# Patient Record
Sex: FEMALE | Race: WHITE | NOT HISPANIC OR LATINO | Employment: OTHER | URBAN - NONMETROPOLITAN AREA
[De-identification: names, ages, dates, MRNs, and addresses within clinical notes are randomized per-mention and may not be internally consistent; named-entity substitution may affect disease eponyms.]

---

## 2017-01-27 ENCOUNTER — HISTORY (OUTPATIENT)
Dept: FAMILY MEDICINE | Facility: OTHER | Age: 67
End: 2017-01-27

## 2017-01-27 ENCOUNTER — HOSPITAL ENCOUNTER (OUTPATIENT)
Dept: RADIOLOGY | Facility: OTHER | Age: 67
End: 2017-01-27
Attending: FAMILY MEDICINE

## 2017-01-27 ENCOUNTER — OFFICE VISIT - GICH (OUTPATIENT)
Dept: FAMILY MEDICINE | Facility: OTHER | Age: 67
End: 2017-01-27

## 2017-01-27 DIAGNOSIS — Z12.39 ENCOUNTER FOR OTHER SCREENING FOR MALIGNANT NEOPLASM OF BREAST: ICD-10-CM

## 2017-01-27 DIAGNOSIS — Z00.00 ENCOUNTER FOR GENERAL ADULT MEDICAL EXAMINATION WITHOUT ABNORMAL FINDINGS: ICD-10-CM

## 2017-01-27 DIAGNOSIS — M54.2 CERVICALGIA: ICD-10-CM

## 2017-01-27 DIAGNOSIS — E78.49 OTHER HYPERLIPIDEMIA: ICD-10-CM

## 2017-01-27 DIAGNOSIS — Z12.31 ENCOUNTER FOR SCREENING MAMMOGRAM FOR MALIGNANT NEOPLASM OF BREAST: ICD-10-CM

## 2017-01-27 DIAGNOSIS — M85.80 OTHER SPECIFIED DISORDERS OF BONE DENSITY AND STRUCTURE, UNSPECIFIED SITE (CODE): ICD-10-CM

## 2017-01-27 LAB
ANION GAP - HISTORICAL: 10 (ref 5–18)
BUN SERPL-MCNC: 18 MG/DL (ref 7–25)
BUN/CREAT RATIO - HISTORICAL: 27
CALCIUM SERPL-MCNC: 9.6 MG/DL (ref 8.6–10.3)
CHLORIDE SERPLBLD-SCNC: 102 MMOL/L (ref 98–107)
CHOL/HDL RATIO - HISTORICAL: 3.54
CHOLESTEROL TOTAL: 248 MG/DL
CO2 SERPL-SCNC: 27 MMOL/L (ref 21–31)
CREAT SERPL-MCNC: 0.66 MG/DL (ref 0.7–1.3)
GFR IF NOT AFRICAN AMERICAN - HISTORICAL: >60 ML/MIN/1.73M2
GLUCOSE SERPL-MCNC: 103 MG/DL (ref 70–105)
HDLC SERPL-MCNC: 70 MG/DL (ref 23–92)
LDLC SERPL CALC-MCNC: 165 MG/DL
NON-HDL CHOLESTEROL - HISTORICAL: 178 MG/DL
PATIENT STATUS - HISTORICAL: ABNORMAL
POTASSIUM SERPL-SCNC: 4.3 MMOL/L (ref 3.5–5.1)
SODIUM SERPL-SCNC: 139 MMOL/L (ref 133–143)
TRIGL SERPL-MCNC: 64 MG/DL

## 2017-01-31 ENCOUNTER — HISTORY (OUTPATIENT)
Dept: RADIOLOGY | Facility: OTHER | Age: 67
End: 2017-01-31

## 2017-01-31 ENCOUNTER — HOSPITAL ENCOUNTER (OUTPATIENT)
Dept: RADIOLOGY | Facility: OTHER | Age: 67
End: 2017-01-31
Attending: FAMILY MEDICINE

## 2017-01-31 DIAGNOSIS — Z12.31 ENCOUNTER FOR SCREENING MAMMOGRAM FOR MALIGNANT NEOPLASM OF BREAST: ICD-10-CM

## 2017-01-31 DIAGNOSIS — Z12.39 ENCOUNTER FOR OTHER SCREENING FOR MALIGNANT NEOPLASM OF BREAST: ICD-10-CM

## 2017-02-08 ENCOUNTER — HOSPITAL ENCOUNTER (OUTPATIENT)
Dept: PHYSICAL THERAPY | Facility: OTHER | Age: 67
Setting detail: THERAPIES SERIES
End: 2017-02-08
Attending: FAMILY MEDICINE

## 2017-02-08 DIAGNOSIS — M54.2 CERVICALGIA: ICD-10-CM

## 2017-02-15 ENCOUNTER — HOSPITAL ENCOUNTER (OUTPATIENT)
Dept: PHYSICAL THERAPY | Facility: OTHER | Age: 67
Setting detail: THERAPIES SERIES
End: 2017-02-15
Attending: FAMILY MEDICINE

## 2017-03-08 ENCOUNTER — HOSPITAL ENCOUNTER (OUTPATIENT)
Dept: PHYSICAL THERAPY | Facility: OTHER | Age: 67
Setting detail: THERAPIES SERIES
End: 2017-03-08
Attending: FAMILY MEDICINE

## 2017-08-18 ENCOUNTER — AMBULATORY - GICH (OUTPATIENT)
Dept: SURGERY | Facility: OTHER | Age: 67
End: 2017-08-18

## 2017-08-18 ENCOUNTER — HISTORY (OUTPATIENT)
Dept: FAMILY MEDICINE | Facility: OTHER | Age: 67
End: 2017-08-18

## 2017-08-18 ENCOUNTER — HISTORY (OUTPATIENT)
Dept: SURGERY | Facility: OTHER | Age: 67
End: 2017-08-18

## 2017-08-18 ENCOUNTER — OFFICE VISIT - GICH (OUTPATIENT)
Dept: FAMILY MEDICINE | Facility: OTHER | Age: 67
End: 2017-08-18

## 2017-08-18 DIAGNOSIS — L98.9 DISORDER OF SKIN OR SUBCUTANEOUS TISSUE: ICD-10-CM

## 2017-08-25 ENCOUNTER — OFFICE VISIT - GICH (OUTPATIENT)
Dept: SURGERY | Facility: OTHER | Age: 67
End: 2017-08-25

## 2017-08-25 DIAGNOSIS — L82.1 OTHER SEBORRHEIC KERATOSIS: ICD-10-CM

## 2017-10-17 ENCOUNTER — AMBULATORY - GICH (OUTPATIENT)
Dept: FAMILY MEDICINE | Facility: OTHER | Age: 67
End: 2017-10-17

## 2017-10-17 DIAGNOSIS — Z23 ENCOUNTER FOR IMMUNIZATION: ICD-10-CM

## 2017-12-27 NOTE — PROGRESS NOTES
Patient Information     Patient Name MRN Sex Edith Alford [id number] Female 1950      Progress Notes by Allyssa Zamudio DO at 2017 12:20 PM     Author:  Allyssa Zamudio DO Service:  (none) Author Type:  PHYS- Osteopathic     Filed:  2017  3:15 PM Encounter Date:  2017 Status:  Signed     :  Allyssa Zamudio DO (PHYS- Osteopathic)            Patient is doing well post-op from there recent skin lesion.   She has been having no nausea or vomiting; no chest pain, shortness of breath or coughing up anything. Patient has been urinating without any difficulties and moving bowel w/ no straining or bleeding. Patient has no calf pain swelling, tenderness, or redness. Patient has been sleeping at night with no problems. Patient has been ambulating without difficulty. Patient has been able to tolerate a regular diet. Patient has had good relief with previously prescribed pain meds.      /80  Temp 98.3  F (36.8  C) (Tympanic)  Wt 61.2 kg (135 lb)  BMI 22.99 kg/m2    HEENT: all negative  No jaundice.  No eye redness or pain.  No throat pain.  L: CTA b/l  Abdom: + BS. no distensions.  LOWER EXTREMITY: no swelling or calf pain  The incision(s) is clean dry and intact without redness, drainage or swelling or bleeding.   ?  Pathology: see Epic  ?  Pt doing well; with no complaints. Reviewed path report. Incision(s): Clean/dry/intact and healing nicely.     Pt should keep the area clean and dry: wash with plain soap and water. Keep covered. Does not need to put anything on the lesions. May use abx ointment if desires.  No strenuous activity or hot tubs/sauna's/pool/lake x1 week. Ice and NSAID's for pain. Monitor for redness/drainage/swelling/increase in pain/temp > 101. May have serous drainage/should not be purulent. Call Clinic if these occur.  As far as heeling: may be red and firm for about 1-3 weeks. This is the normal heeling process and will smooth out to a silvery/white line.  Avoid sun exposure X1 year. May take months for redness to dissipate. If is sun burnt, will stay red. Can use vit E oil.  Today we discussed wound care, activities, return to work, warnings signs. Pt is doing well.    The patient and I reviewed safe sun practices today: the importance of staying out of the sun; especially between 10-PM, wearing sun screen SPF > 50, and protective clothing. We also discussed the ABC's of skin cancers including: changes in size, uniformity, borders, coloration, bleeding, or any irregularity or changes. If these occur, seek medical attention. The patient should have a complete skin exam by a medical professional every 6 months, and any questionable/suspicious, or changing lesions should be removed.         Patient Active Problem List     Diagnosis  Code     Carpal tunnel syndrome G56.00     ACP (advance care planning) Z71.89     Osteopenia M85.80     Hyperlipidemia E78.5         Call the office if have any questions or concern's.  F/u with PCP for routine medical care.  Cuellar not require further pain med's.  Will F/U :finn Zamudio, DO

## 2017-12-27 NOTE — PROGRESS NOTES
Patient Information     Patient Name MRN Sex Edith Alford [id number] Female 1950      Progress Notes by Allyssa Zamudio DO at 2017  3:00 PM     Author:  Allyssa Zamudio DO Service:  (none) Author Type:  PHYS- Osteopathic     Filed:  2017  1:10 PM Encounter Date:  2017 Status:  Signed     :  Allyssa Zamudio DO (PHYS- Osteopathic)            Clinic Procedure Note      PMx, PSx, and social Hx are reviewed and updated in [facility]    Current Outpatient Prescriptions on File Prior to Visit       Medication  Sig Dispense Refill     CALCIUM CARBONATE/VITAMIN D2 (CALCIUM 600 + D ORAL) Take  by mouth. Take daily as directed on bottle       multivitamins-minerals-lutein (CENTRUM SILVER) Tab tablet Take 1 tablet by mouth once daily.       PYRIDOXINE HCL (VITAMIN B-6 ORAL) Take 1 Tab by mouth once daily.       No current facility-administered medications on file prior to visit.        No Known Allergies      No visits with results within 90 Day(s) from this visit.  Latest known visit with results is:    Office Visit on 2017      Component  Date Value     CHOLESTEROL,TOTAL 2017 248*     TRIGLYCERIDES 2017 64      HDL CHOLESTEROL 2017 70      NON-HDL CHOLESTEROL 2017 178*     CHOL/HDL RATIO            2017 3.54      LDL CHOLESTEROL 2017 165*     PATIENT STATUS            2017 NON-FASTING      SODIUM 2017 139      POTASSIUM 2017 4.3      CHLORIDE 2017 102      CO2,TOTAL 2017 27      ANION GAP 2017 10      GLUCOSE 2017 103      CALCIUM 2017 9.6      BUN 2017 18      CREATININE 2017 0.66*     BUN/CREAT RATIO           2017 27      GFR if  2017 >60      GFR if not  [employer]meri* 2017 >60          Any imagining associated with this vist was reviewed.      Indication  Edith Smith is seen at the request of the Cyndie Andre MD.  Edith Smith  presents for  lesion removal. We have discussed this procedure, including option  of not performing surgery, technique of surgery and potential for  bleeding/infection/scarring/need for removal of more tissue and post-procedural care.  Patient is able to do post procedural dressing changes and understands what is  Expected.  + sun exposure  No history of skin cancer.  No blood thinners.     OBJECTIVE:    Edith Smith appears well. Vitals are normal. The patient has no allergies to lidocaine or  suture material. The patient not on any blood thinners.  Informed consent was obtained prior to beginning the procedure. The area was marked  and doubled checked/ID ed with the pt. PAUSE for the CAUSE completed. The risks of  lesion removal include but are not limited to: bleeding, infection, scarring, reoccurrence,  and the need for removal of more tissue, and complications of anesthesia.    Location  ASSESSMENT: The lesion is o.5Cm in size. Color:flesh   Borders: reg  Differential diagnosis includes:new lesion     Location: below LEFT orbit    Procedural Sedation  1% lidocaine w/ Epinephrine  1cc    Technique  Patient appears well. Vitals are normal. The patient has no allergies to lidocaine or  suture material. The patient not on any blood thinners.  Edith Smith has no history of keloids or problems with healing in the past.    Skin: see HPI    Informed consent was obtained prior to beginning the procedure. The area was marked  and doubled checked/ID ed with the pt. PAUSE for the CAUSE completed. The risks of  lesion removal include but are not limited to: bleeding, infection, scarring, reoccurrence,  and the need for removal of more tissue, and complications of anesthesia.  After informed consent was obtained, using Chloroprep for cleansing and 1%   Lidocaine w/ epi for anesthetic; using sterile technique, PROCEDURE:excisioin  was performed.    The lesion is 0.25cm in size. The incision was 0.3Cm long.  Closure:cauterized w/  silver nittrate.     An Antibiotic salve and steriledressing is applied, and wound care instructions provided. The procedure was well tolerated without complications.    Plan:  The patient will follow up in 7- review of pathology. If thereis any bleeding/redness/drainage/swelling/pain or tenderness- call the clinic. Patient  was given instructions in wound care, acivity, warning signs, appointment for follow up.  If the patient has any questions or concerns, should call our clinic or go to ER/urgent  care after hours. Patient expressed understanding in directions for care, and was given a  written copy of instructions.    Rx=see EPIC    Pt tolerated the procedure well without complications  Patient was given instruction in activity restrictions, wound care and dressing changes. Medication usage, diet, warning signs to look for (and what to do if they occur), and an appointment for follow-up.      Caring for Your Incision      You ll need to help care for your incision after surgery and certain medical procedures. To close an incision, your healthcare provider used stitches (sutures), special strips of surgical tape called Steri-Strips, surgical staples, or surgical skin glue. Follow the tips on this sheet to help stop bleeding, speed healing, and prevent infection of your incision.      Types of incision closures        Home care  Always wash your hands before touching your incision.  Keep the incision clean, dry, and out of water, keep the incision out of water.  Do not to pick at the scabs. Scabs help protect the wound.  You can take a shower in 24 hours and wash the incision with soap and water. Pat dry/don t scrub. It s OK to wash around the incision. But don t spray water directly on it.  Pat stitches dry if they get wet. Don't rub.  Check the incision site daily for pain, redness, drainage, swelling, or separation of the incision edges.  If there is a bandage (dressing) over the incision, leave the dressing in  place until you are told to remove it or change it. Using clean hands change the dressing as directed by your healthcare provider. Always wash your hands before changing your dressing.  Make sure any clothing that touches the incision is loose-fitting. This will prevent rubbing.   Try to avoid from rough play, contact sports, or physical activities. This can put you at risk of opening the incision.  Make sure you avoid doing things that could cause dirt or sweat to get in or on the incision.  As your incision heals, the skin may appear pink or red. It may also feel slightly bumpy or raised. This is called a healing ridge. Over time, the color should fade and the raised skin will become less noticeable.    Care for specific closures  Follow these guidelines unless your healthcare provider tells you otherwise:  Sutures or staples. Once you no longer need to keep these dry, clean the incision or wound daily. First remove the bandage using clean hands. Then wash the area gently with soap and warm water. Use a wet cotton swab to loosen and remove any blood or crust that forms. After cleaning, put a thin layer of antibiotic ointment on. Then put on a new bandage.      Pain Control    Use ice!  Ice keeps the swelling down and swelling is what causes pain.  Never apply ice directly to the skin.  Wrap it in a towel or cloth.  Apply ice 20 minutes on and 20 minutes off for pain control.  Use as needed.    Take tylenol 325 mg by mouth with food every 4 hours as needed for pain. Or ibuprofen 400 mg by mouth with food every 4 hours as needed for pain.  Do not take tylenol if you have a history of heavy drinking , hepatits C or liver problems.  Do not take ibuprofen if you have a history of stomach ulcers/problems, bleeding problem or kidney issues.           Follow-up care      Follow up with your healthcare provider to ask how long sutures or staples should be left in place. Be sure to return for suture or staple removal as  directed. If dissolving stitches were used in your mouth, these will not need to be removed. They should fall out or dissolve on their own.  If tape closures were used, remove them yourself when your healthcare provider tells you to if they have not fallen off on their own. If skin glue was used to close your incision, the glue will wear off by itself.    When to seek medical care  Call your healthcare provider right away if you has any of these:  More pain, redness, swelling, bleeding, or foul-smelling discharge around the incision area  Fever of 101 F (38.3 C) or higher, or as directed by your  healthcare provider  Shaking chills  Vomiting or nausea that doesn t go away  Numbness, coldness, or tingling around the incision area, or changes in skin color  Opening of the sutures or wound  Stitches or staples come apart or fall out or surgical tape falls off before 7 days, or as directed by your healthcare provider

## 2017-12-28 NOTE — PATIENT INSTRUCTIONS
Patient Information     Patient Name MRN Sex Edith Alford [id number] Female 1950      Patient Instructions by Allyssa Zamudio DO at 2017  3:00 PM     Author:  Allyssa Zamudio DO  Service:  (none) Author Type:  PHYS- Osteopathic     Filed:  2017  1:10 PM  Encounter Date:  2017 Status:  Addendum     :  Allyssa Zamudio DO (PHYS- Osteopathic)        Related Notes: Original Note by Allyssa Zamudio DO (PHYS- Osteopathic) filed at 2017  1:09 PM            Caring for Your Incision      You ll need to help care for your incision after surgery and certain medical procedures. To close an incision, your healthcare provider used stitches (sutures), special strips of surgical tape called Steri-Strips, surgical staples, or surgical skin glue. Follow the tips on this sheet to help stop bleeding, speed healing, and prevent infection of your incision.      Pain Control    Use ice!  Ice keeps the swelling down and swelling is what causes pain.  Never apply ice directly to the skin.  Wrap it in a towel or cloth.  Apply ice 20 minutes on and 20 minutes off for pain control.  Use as needed.    Take tylenol 325 mg by mouth with food every 4 hours as needed for pain. Or ibuprofen 400 mg by mouth with food every 4 hours as needed for pain.  Do not take tylenol if you have a history of heavy drinking , hepatits C or liver problems.  Do not take ibuprofen if you have a history of stomach ulcers/problems, bleeding problem or kidney issues.     Types of incision closures    Surgical stitches (sutures) are placed by sewing the edges of an incision together with surgical thread. Sutures are either absorbable or non-absorbable. Absorbable sutures break down in the body over time. Non-absorbable sutures need to be removed.    Home care  Always wash your hands before touching your incision.  Keep the incision clean, dry, and out of water, keep the incision out of water.  Do not to pick at the scabs.  Scabs help protect the wound.  You can take a shower in 24 hours and wash the incision with soap and water. Pat dry/don t scrub. It s OK to wash around the incision. But don t spray water directly on it.  Pat stitches dry if they get wet. Don't rub.  Check the incision site daily for pain, redness, drainage, swelling, or separation of the incision edges.  If there is a bandage (dressing) over the incision, change this every 24 hours as instructed by your provider. Using clean hands change the dressing as directed by your healthcare provider. Always wash your hands before changing your dressing.  Make sure any clothing that touches the incision is loose-fitting. This will prevent rubbing. If the incision is on the head, keep your child from wearing caps or other head coverings. These may rub against the incision.  Try to avoid from rough play, contact sports, or physical activities for two weeks. This can put you at risk of opening the incision.  Make sure you avoid doing things that could cause dirt or sweat to get in or on the incision.  As your incision heals, the skin may appear pink or red. It may also feel slightly bumpy or raised. This is called a healing ridge. Over time, the color should fade and the raised skin will become less noticeable.    Care for specific closures  :  Sutures or staples. Once you no longer need to keep these dry, clean the incision or wound daily, generally after the first 24 hours.  First remove the bandage using clean hands. Then wash the area gently with soap and warm water. Use a wet cotton swab to loosen and remove any blood or crust that forms. After cleaning, put a thin layer of antibiotic ointment on. Then put on a new bandage.      Follow-up care  Staples or sutures generally need to be removed in 7days.    Be sure to return for suture or staple removal as directed. If dissolving stitches were used in your mouth, these will not need to be removed. They should fall out or  dissolve on their own.  If tape closures were used, remove them yourself when your healthcare provider tells you to if they have not fallen off on their own.     When to seek medical care  Call your healthcare provider right away if you have any of these:  More pain, redness, swelling, bleeding, or foul-smelling discharge around the incision area  Fever of 101 F (38.3 C) or higher, or as directed by your child's healthcare provider  Shaking chills  Vomiting or nausea that doesn t go away  Numbness, coldness, or tingling around the incision area, or changes in skin color  Opening of the sutures or wound  Stitches or staples come apart or fall out or surgical tape falls off before 7 days, or as directed by your healthcare provider

## 2017-12-28 NOTE — PATIENT INSTRUCTIONS
Patient Information     Patient Name MRN Sex Edith Alford [id number] Female 1950      Patient Instructions by Allyssa Zamudio DO at 2017 12:20 PM     Author:  Allyssa Zamudio DO Service:  (none) Author Type:  PHYS- Osteopathic     Filed:  2017  3:14 PM Encounter Date:  2017 Status:  Signed     :  Allyssa Zamudio DO (PHYS- Osteopathic)            Pt should keep the area clean and dry: wash with plain soap and water. Keep covered. Does not need to put anything on the lesions. May use abx ointment if desires. Avoid lifting Over 5 #'s x1 week. No strenuous activity or hot tubs/sauna's/pool/lake x1 week. Ice and NSAID's for pain. Monitor for redness/drainage/swelling/increase in pain/temp > 101. May have serous drainage/should not be purulent. Call Clinic if these occur.  As far as heeling: may be red and firm for about 1-3 weeks. This is the normal heeling process and will smooth out to a silvery/white line. Avoid sun exposure X1 year. May take months for redness to dissipate. If is sun burnt, will stay red. Can use vit E oil.  Today we discussed wound care, activities, return to work, warnings signs. Pt is doing well.

## 2017-12-30 NOTE — NURSING NOTE
Patient Information     Patient Name MRN Sex Edith Alford [id number] Female 1950      Nursing Note by Edith Pleitez at 2017 12:20 PM     Author:  Edith Pleitez Service:  (none) Author Type:  (none)     Filed:  2017 12:09 PM Encounter Date:  2017 Status:  Signed     :  Edith Pleitez            Patient comes in for follow up after removing skin lesion by left eye.  Edith Pleitez LPN ....................  2017   12:09 PM

## 2017-12-30 NOTE — NURSING NOTE
Patient Information     Patient Name MRN Edith Reeves [id number] Female 1950      Nursing Note by Lalitha Moore at 2017 11:30 AM     Author:  Lalitha Moore Service:  (none) Author Type:  (none)     Filed:  2017 11:45 AM Encounter Date:  2017 Status:  Signed     :  Lalitha Moore            Patient here to have a growth checked under her left eye that has slight discoloration.   Lalitha Moore LPN ..........2017 11:20 AM

## 2017-12-30 NOTE — NURSING NOTE
Patient Information     Patient Name MRN Sex Edith Alford [id number] Female 1950      Nursing Note by Gisselle Sanchez at 2017  3:00 PM     Author:  Gisselle Sanchez Service:  (none) Author Type:  (none)     Filed:  2017  3:16 PM Encounter Date:  2017 Status:  Signed     :  Gisselle Sanchez            Universal Protocol    A. Pre-procedure verification complete yes  1-relevant information / documentation available, reviewed and properly matched to the patient; 2-consent accurate and complete, 3-equipment and supplies available    B. Site marking complete No  Site marked if not in continuous attendance with patient    C. TIME OUT completed yes  Time Out was conducted just prior to starting procedure to verify the eight required elements: 1-patient identity, 2-consent accurate and complete, 3-position, 4-correct side/site marked (if applicable), 5-procedure, 6-relevant images / results properly labeled and displayed (if applicable), 7-antibiotics / irrigation fluids (if applicable), 8-safety precautions.  Gisselle Sanchez LPN.......................... 2017  3:14 PM

## 2017-12-30 NOTE — NURSING NOTE
Patient Information     Patient Name MRN Edith Reeves [id number] Female 1950      Nursing Note by Katalina Malhotra at 2017  3:00 PM     Author:  Katalina Malhotra Service:  (none) Author Type:  (none)     Filed:  2017  3:22 PM Encounter Date:  2017 Status:  Signed     :  Katalina Malhotra            Patient here for consult of lesion under left eye. Relatively new, not changing  Katalina Malhotra LPN..............................2017  2:58 PM

## 2018-01-03 NOTE — PROGRESS NOTES
Patient Information     Patient Name MRN Sex     Edith Smith [id number] Female 1950      Progress Notes by Delma Matamoros PT at 3/8/2017  8:46 AM     Author:  Delma Matamoros PT Service:  (none) Author Type:  PT- Physical Therapist     Filed:  3/8/2017  1:36 PM Date of Service:  3/8/2017  8:46 AM Status:  Signed     :  Delma Matamoros PT (PT- Physical Therapist)            [facility]  Outpatient PT - Progress Note        Date of Service:  3/8/2017    Number of Visits: 3    Patient Name: Edith Smith   YOB: 1950   Referring MD/Provider: Cyndie Andre MD  Medical and Treatment Diagnosis: Neck pain on left side  PT Treatment Diagnosis: left sided neck pain, degenerative disc disease/OA, limited ROM, postural dysfunction, myofascial restriction, neural restriction, facet, rib and uncovertebral joint restriction  Insurance: Medicare and [facility]  Start of Service: 17  Certification Dates: Start of Service: 17  Medicare/[place] Re-Cert Due: 17            Subjective      Patient returns after being gone on vacation. Notes that her neck is much better. It is not a constant ache and my mobility is better. I have been doing all my stretches.  I do have a very sore spot on my spine, just above the waist.  Notes that she has gone about 5 nights without having to take ibuprofen. Notes that she is using it as needed during the day--only occasionally. Notes that she is just not as tight any more.     Pain Ratin = no pain, comfortable and  1 = Mild Pain, (Bothersome, Annoying, Irritating, Nagging) / Location:  Left upper back and neck.If I move my neck I still get a sharp pain that is 4-5/10.     Subjective rating of current functional limitations:     Patient Specific Functional and Pain Scales (PSFS):   Clinician Instructions: Complete after the history and before the exam.   Initial Assessment: We want to know what 3 activities in your life you are  unable to perform, or are having the most difficulty performing, as a result of your chief problem. Please list and score at least 3 activities that you are unable to perform, or having the most difficulty performing, because of your chief problem.   Patient Specific Activity Scoring Scheme (score one number for each activity):   Activity Initial Score (0-10)  0= Unable to perform activity  10= Able to perform activity at same level as before injury or problem Current Score 3/8/2017    1. After Shoveling snow 8/10 8/10 has not done   2. Working around house 7/10 10/10   3. Looking to the left 4/10 8/10   Totals:  19/30 = 63 % ability which relates to 37% impairment 26/30 = 87% ability which relates to 13% impairment   Patient verbally states that they understand that the information they have provided above is current and complete to the best of their knowledge.   Patient Specific Functional Scale Modifier Scale Conversion: (patient's modifier that correlates with pt's score on PSFS): 7-CI (10% Impaired).              Functional Activity No Difficulty Mild Difficulty Mod. Difficulty Severe Difficulty   Sleeping x         Walking x         Lifting/Carrying x         Bending    x      Sitting/Driving   1 Hour x         Work Activities  x x              Objective  Items left blank indicate that the test was inappropriate or not meaningful at the time of visit and therefore not performed.  Postural loading:  General Listening: left  lower cervical/upper thoracic  Head: Left to left and left to right  Shoulders: decreased bilateral proximal  Pelvis: decreased but symmetrical    Cervical AROM    Flexion WNL    Extension 30      Right Left   Sidebend painful right and left 20 20   Rotation painful left 70 62          Much less myofascial tightness in the cervical region noted today vs initial session.    Positional Thoracic Facet Diagnoses:  FRS right  T12-L1, left T11-12         Noted to have much less tightness in the  "left levator scapula with restriction of the left dorsal scapular nerve. Trigger point at the levator attachment to the scapula. Very tender yet over C56 left with neural restriction noted.            Today's Intervention:    MET seated FRS right T12-L1, left T11-12  Seated thoracolumbar side glides  Neural manipulation right and left dorsal scapular nerve  Gentle manual stretch to right and left levator scapula  Neural manipulation C5, C6 right,  C5 right <> superior brachial plexus  Supine QL stretch    Verbal review of HEP with no concerns noted by pt    Home Exercise Program:  sidelying trunk rotation for thoracic mobilization: both knees flexed, below, at and above hip level X 3 each bilaterally  Seated upper trap/SCM stretch 3 X 10-15 seconds bilaterally  Seated levator scapula stretch 3 X 10-15 seconds bilaterally  Seated scalene stretch 3 X 10-15 seconds bilaterally with care on the right  Supine Luxembourger with LTR X 8-10 reciprocally  Seated anterior cervical fascial self stretch 3 X 15 seconds  Clavipectoral Fascia self stretch in doorframe 3 X 10-15\" bilaterally  Seated chin nods  Supine QL stretch     Increase water intake, heat/ice prn     Assessment     Therapist Assessment / Clinical Impression: Edith Smith has been seen 3 times in PT due to left sided neck pain which has been present for about one year She is currently much improved and pleased with her status. Feels her HEP is very helpful. She has note improvement in turning her head as well as doing housework. Less need for ibuprofen is reported to self manage. Clinically she has made significant improvement in her ROM and flexibility with much less myofascial and neural restriction noted. She continues to present with  left sided neck pain, degenerative disc disease/OA,  Slightly limited ROM, myofascial restriction, neural restriction.  Excellent compliance is noted with HEP and she feels ready to continue independently.    Clinical " Impression:  Cervical pain left  Facet, uncovertebral and rib dysfunction: cervical, thoracic  Postural Dysfunction:  Myofascial:  Limited ROM     Direct PT services are indicated to address the above noted impairments and to promote self management.        G codes and Modifier taken from patient completing the PSFS: 3/8/2017   Current Primary G Code and Modifier:   Per the Patient's intake and/or assessment the Primary G Code is: Handling Objects .  The Patient's Impairment, Limitation or Restriction Modifier would be best described as: CI 1% - 20% Impairment.   Goal Primary G Code and Modifier:   The Patient's G Code Goal would be: Handling Objects   The Patient's Impairment, Limitation or Restriction Modifier goal would be best described as: CI - 1% - 20% Impairment.      Goals:  Patient goal (time reference required): To take care of the pain without having to take medication and to learn more about what is going on in my neck. (4-8 weeks) . 3/8/2017 much improved     Long term goal: Patient is to self-manage symptoms and return to prior/improved function in 4-8 weeks.  3/8/2017 in progress but should continue to improve with HEP.     Functional goals:   Patient is to be independent in their Home Exercise Program in 4 weeks. 3/8/2017 Goal met.  Patient is to have improved cervical spine mobility to 65 degrees rotation left to allow for improved looking over her shoulder in 8 weeks. 3/8/2017 Goal partially met.  Patient is report the ability to perform normal daily activities such as housework with proper pacing and rest breaks with minimal to no neck pain in 8 weeks. 3/8/2017 Goal met.  Patient is to display and maintain normal joint mobility/symmetry in the thoracic spine to allow better support of the neck in 8 weeks 3/8/2017 in progress  Patient is to demonstrate decreased muscular pain and tightness on the left to assist her with self management in 8 weeks.  3/8/2017 in progress         Response to  Intervention:  Patient noting improvement. Much less myofascial tightness and improved ROM noted.    Plan    Treatment Plan / Targeted Outcomes:     Frequency:   8-12 visits     Duration of Treatment: 2 months    Planned Interventions:  May include any of the following:  Home Exercise Program development  Therapeutic Exercise (ROM & Strengthening)  Manual Therapy  Neuromuscular Re-education  Ultrasound  Electrical Stimulation  Manual traction  Therapeutic Activities  Posture and Body Mechanics Education    Plan for next visit:  No further PT scheduled at this time: will hold chart X 4 weeks and pt will continue with HEP. She will contact dept to schedule further PT should she fail to improve on her own.    Student or PTA has been instructed in and demonstrates skills necessary to carry out above stated treatment plan: Yes    Thank you for your referral to [facility].  Please call with any questions, concerns or comments.  [phone]

## 2018-01-03 NOTE — PROGRESS NOTES
Patient Information     Patient Name MRN Sex Edith Alford [id number] Female 1950      Progress Notes by Joaquina Bonds R.T. [facility] at 2017 12:44 PM     Author:  Joaquina Bonds R.T. ([facility]) Service:  (none) Author Type:  (none)     Filed:  2017 12:44 PM Date of Service:  2017 12:44 PM Status:  Signed     :  Joaquina Bonds R.T. (TRT) ([facility] - Registered Radiologic Technologist)            Falls Risk Criteria:    Age 65 and older or under age 4        Sensory deficits    Poor vision    Use of ambulatory aides    Impaired judgment    Unable to walk independently    Meets High Risk criteria for falls:  Yes               1.  Do you have dizziness or vertigo?    no                    2.  Do you need help standing or walking?   no                 3.  Have you fallen within the last 6 months?    no           4.  Has the patient been fasting?      no       If any risks are marked Yes, the following interventions are utilized:    Do not leave patient unattended     Assist patient in the dressing room and bathroom    Have ambulatory aides available throughout procedure    Involve patient s family if available

## 2018-01-03 NOTE — NURSING NOTE
Patient Information     Patient Name MRN Edith Reeves [id number] Female 1950      Nursing Note by Cha Narayan at 2017  8:30 AM     Author:  Cha Narayan Service:  (none) Author Type:  (none)     Filed:  2017  8:45 AM Encounter Date:  2017 Status:  Signed     :  Cha Narayan MARTA Smith is a 67 y.o. female presenting for a physical today.  Cha Narayan LPN 2017 8:34 AM
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
## 2018-01-03 NOTE — PROGRESS NOTES
Patient Information     Patient Name MRN Sex Edith Alford [id number] Female 1950      Progress Notes by Cyndie Andre MD at 2017  8:30 AM     Author:  Cyndie Andre MD Service:  (none) Author Type:  Physician     Filed:  2017  9:48 AM Encounter Date:  2017 Status:  Signed     :  Cyndie Andre MD (Physician)            ANNUAL PHYSICAL - FEMALE    HPI: Edith Smith is a 67 y.o. female who presents for a yearly exam.  Concerns include: intermittent L neck pain for the past year, more constant recently.     No LMP recorded. Patient is postmenopausal.   Contraception: n/a  Risk for STI?: no  Last pap:   Any hx of abnormal paps:  no  FH of early CA?: no  Tobacco?: no  Calcium intake: yes  DEXA:   Last mammo: 2015  Colonoscopy:   Immunizations: up to date     Patient Active Problem List       Diagnosis  Date Noted     Hyperlipidemia  2017     Osteopenia  01/10/2014     ACP (advance care planning)  2014     Carpal tunnel syndrome  2013     History of            Past Medical History      Diagnosis   Date     Lyme disease  2012     Vaginal delivery       x 1        Past Surgical History       Procedure   Laterality Date     Tubal ligation        age 38       Dilation and curettage        age 17       Colonoscopy screening   11     Hyperplastic polyps only.  Next due        Cataract removal        Dr. Beltran. rt       Cataract removal   10/2013     Dr. Walden, left         Social History     Social History        Marital status:       Spouse name: N/A     Number of children:  N/A     Years of education:  N/A     Occupational History      Not on file.     Social History Main Topics          Smoking status:   Former Smoker      Types:  Cigarettes      Quit date:  2000      Smokeless tobacco:   Never Used      Alcohol use   3.5 oz/week     7 Standard drinks or equivalent per week        Comment: slightly       Drug use:   No       "Sexual activity:   Not on file      Other Topics  Concern     Not on file      Social History Narrative     . 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    Enjoys gardening, regular exercise, outdoor activity.     1 grown son, no regular contact.     [place]                                    Family History       Problem   Relation Age of Onset     Cancer  Mother      lung CA,  age 65, borderline DM.       Other  Father       age 94, related to CVA, HTN       Other  Sister 71     osteopenia, uterine CA       Other  Sister      fibromyalgia,  age 77, multiple chronic health issues, unknown cause of death       Cancer-breast  No Family History        Current Outpatient Prescriptions       Medication  Sig Dispense Refill     CALCIUM CARBONATE/VITAMIN D2 (CALCIUM 600 + D ORAL) Take  by mouth. Take daily as directed on bottle       multivitamins-minerals-lutein (CENTRUM SILVER) Tab tablet Take 1 tablet by mouth once daily.       PYRIDOXINE HCL (VITAMIN B-6 ORAL) Take 1 Tab by mouth once daily.       No current facility-administered medications for this visit.      Medications have been reviewed by me and are current to the best of my knowledge and ability.       REVIEW OF SYSTEMS:  15 system ROS completed and negative other than: See HPI.    PHYSICAL EXAM:  Visit Vitals       /74     Pulse 76     Ht 1.638 m (5' 4.5\")     Wt 58.6 kg (129 lb 3.2 oz)     Breastfeeding No     BMI 21.83 kg/m2     CONSTITUTIONAL:  Alert, cooperative, NAD.  EYES: No scleral icterus.  PERRLA.  Conjunctiva clear.  ENT/MOUTH: External ears and nose normal.  TMs normal.  Moist mucous membranes. Oropharynx clear.    ENDO: No thyromegaly or thyroid nodules.  LYMPH:  No cervical or supraclavicular LA.    BREASTS: No skin abnormalities, no erythema.  No discrete masses.  No nipple discharge, no axillary, supra- or infraclavicular " LA.   CARDIOVASCULAR: Regular, S1, S2.  No S3 or S4.  No murmur/gallop/rub.  No peripheral edema.  RESPIRATORY: CTA bilaterally, no wheezes, rhonchi or rales.  GI: Bowel sounds wnl.  Soft, nontender, nondistended.  No masses or HSM.  No rebound or guarding.  : Vulva: normal, no lesions or discharge  Urethral meatus: normal size and location, no lesions or discharge  Urethra: no tenderness or masses  Bladder: no fullness or tenderness  Vagina: normal appearance, no abnormal discharge, no lesions.  No evidence of cystocele or rectocele.  Cervix: no cervical motion tenderness  Uterus: normal size and position, mobile, non-tender  Pap smear obtained: no  Adnexa: no palpable masses bilaterally  MSKEL: Grossly normal ROM.  No clubbing.  INTEGUMENTARY:  Warm, dry.  No rash noted on exposed skin.  NEUROLOGIC: Facies symmetric.  Grossly normal movement and tone.  No tremor.  PSYCHIATRIC: Affect normal.  Speech fluent.  Though content linear.     ASSESSMENT/PLAN:    ICD-10-CM    1. Other hyperlipidemia E78.4 LIPID PANEL      BASIC METABOLIC PANEL      LIPID PANEL      BASIC METABOLIC PANEL   2. Osteopenia M85.80    3. Health care maintenance Z00.00    4. Encounter for screening breast examination Z12.39 XR MAMMO BILAT SCREENING   5. Encounter for screening mammogram for malignant neoplasm of breast  Z12.31 XR MAMMO BILAT SCREENING   6. Neck pain on left side M54.2 XR SPINE CERVICAL 3 VIEWS      AMB CONSULT TO PHYSICAL THERAPY       Relevant cancer screening discussed.    Counseled on healthy diet, Calcium and vitamin D intake, and exercise.    Cnydie Andre MD

## 2018-01-03 NOTE — PROGRESS NOTES
Patient Information     Patient Name MRN Sex Edith Alford [id number] Female 1950      Progress Notes by Cyndie Andre MD at 2017 12:14 PM     Author:  Cyndie Andre MD Service:  (none) Author Type:  Physician     Filed:  2017 12:14 PM Date of Service:  2017 12:14 PM Status:  Signed     :  Cyndie Andre MD (Physician)            Notified of results via [employer].

## 2018-01-03 NOTE — PROGRESS NOTES
Patient Information     Patient Name MRN Sex Edith Alford [id number] Female 1950      Progress Notes by Cyndie Andre MD at 2017 12:13 PM     Author:  Cyndie Andre MD Service:  (none) Author Type:  Physician     Filed:  2017 12:13 PM Encounter Date:  2017 Status:  Signed     :  Cyndie Andre MD (Physician)            Notified of results via [employer].

## 2018-01-03 NOTE — PROGRESS NOTES
"Patient Information     Patient Name MRN Sex Edith Alford [id number] Female 1950      Progress Notes by Delma Matamoros, PT at 2/15/2017  8:46 AM     Author:  Delma Matamoros PT Service:  (none) Author Type:  PT- Physical Therapist     Filed:  2/15/2017 10:45 AM Date of Service:  2/15/2017  8:46 AM Status:  Signed     :  Delma Matamoros PT (PT- Physical Therapist)            [facility]  Outpatient PT - Daily Note        Date of Service: 2/15/2017     Number of Visits: 2    Patient Name: Edith Smith   YOB: 1950   Referring MD/Provider: Cyndie Andre MD  Medical and Treatment Diagnosis: Neck pain on left side  PT Treatment Diagnosis: left sided neck pain, degenerative disc disease/OA, limited ROM, postural dysfunction, myofascial restriction, neural restriction, facet, rib and uncovertebral joint restriction  Insurance: Medicare and [facility]  Start of Service: 17  Certification Dates: Start of Service: 17  Medicare/MA Re-Cert Due: 17            Subjective      \"I think my pain is better. I do have my moments but overall it is better. I am moving better. I still have a period of time in the afternoon where my neck is really sore. I usually end up taking something for it. My exercises went well. \"     Pain Ratin = no pain, comfortable and  1 = Mild Pain, (Bothersome, Annoying, Irritating, Nagging) / Location:  Left upper back and neck. I have one spot at the top of the shoulder blade that is very painful.        Objective  Items left blank indicate that the test was inappropriate or not meaningful at the time of visit and therefore not performed.  Postural loading:  General Listening: left  lower cervical/upper thoracic  Head: Left to left and left to right  Shoulders: decreased bilateral proximal  Pelvis: decreased but symmetrical    Rotation AROM: 70 right , 63 left. Extension to 25.    Much less myofascial tightness in the " "cervical region noted today vs initial session.    Positional Thoracic Facet Diagnoses:    ERS Right ERS Left Neutral  SB R/Rot Left Neutral  SB L/Rot Right FRS Right FRS Left   C7-T1         x     T1-2  x        X    T2-3 x       x     T3-4 x       x     T4-5 x            T5-6           x   T6-7         x     T7-8               T8-9               T9-10               T10-11               T11-12               T12-L1               (* ERS = extended/rotated/sidebent; FRS = flexed/rotated/sidebent)   Noted that the spine levels out from restriction at T67 at L45        Structural Rib Dysfunctions:  Superior Subluxed 1st Rib: right  Laterally flexed 2nd Rib: neg      Noted to have tightness in the left levator scapula with restriction of the left dorsal scapular nerve. Trigger point at the levator attachment to the scapula.         Lower Cervical Joint Mobility Testing:    Rotation right Rotation Left Sidebend Right Sidebend Left   C2-3           C3-4           C4-5   x   x   C5-6 x x x x   C6-7 x x x x   C7-T1            Today's Intervention:    Neural manipulation right and left dorsal scapular nerve  Trigger point release left levator scapula  Gentle manual stretch to right and left levator scapula  MFR deep anterior cervical fascia  Neural manipulation C5 right <> superior brachial plexus  MET FRS right T1-2, ERS right T1-2  Seated upper thoracic sideglides  MET superiorly subluxed first rib left seated.    Supine Surinamese with LTR X 8-10 reciprocally  Seated anterior cervical fascial self stretch 3 X 15 seconds  Clavipectoral Fascia self stretch in doorframe 3 X 10-15\" bilaterally  Sidelying trunk rotation for thoracic mobilization: both knees flexed, below, at and above hip level X 3 each bilaterally  Seated upper trap/SCM stretch 3 X 10-15 seconds bilaterally  Seated levator scapula stretch 3 X 10-15 seconds bilaterally  Seated scalene stretch 3 X 10-15 seconds bilaterally with care on the right  Seated chin " "nods    Home Exercise Program:  sidelying trunk rotation for thoracic mobilization: both knees flexed, below, at and above hip level X 3 each bilaterally  Seated upper trap/SCM stretch 3 X 10-15 seconds bilaterally  Seated levator scapula stretch 3 X 10-15 seconds bilaterally  Seated scalene stretch 3 X 10-15 seconds bilaterally with care on the right  Supine Panamanian with LTR X 8-10 reciprocally  Seated anterior cervical fascial self stretch 3 X 15 seconds  Clavipectoral Fascia self stretch in doorframe 3 X 10-15\" bilaterally  Seated chin nods     Increase water intake, heat/ice prn     Assessment     Therapist Assessment / Clinical Impression: Edith Smith is referred to PT due to left sided neck pain which has been present for about one year but becoming more frequent to a now daily basis. She is noted to have disc degeneration and multiple arthritic changes on Xray, most notable at C56, C67. She has been having difficulty turning her head as well as pain zfter shoveling or doing housework. She is getting slowly worse. She has primarily tried home use of heat and ibuprofen to self manage. Clinically she presents with  left sided neck pain, degenerative disc disease/OA, limited ROM, postural dysfunction, myofascial restriction, neural restriction, facet, rib and uncovertebral joint restriction. Pain is left sided but she isnoted to have multiple restrictions on the right side as well. She is not interested in attending PT for more than a few sessions at this time which may limit her outcome.     Clinical Impression:  Cervical pain left  Facet, uncovertebral and rib dysfunction: cervical, thoracic  Postural Dysfunction:  Myofascial:  Limited ROM     Direct PT services are indicated to address the above noted impairments and to promote self management.        G codes and Modifier taken from patient completing the PSFS: 2/8/2017  Initial Primary G Code and Modifier:   Per the Patient's intake and/or assessment " the Primary G Code is: Handling Objects .  The Patient's Impairment, Limitation or Restriction Modifier would be best described as: CJ - 20% - 40% Impairment.   Goal Primary G Code and Modifier:   The Patient's G Code Goal would be: Handling Objects   The Patient's Impairment, Limitation or Restriction Modifier goal would be best described as: CI - 1% - 20% Impairment.      Goals:  Patient goal (time reference required): To take care of the pain without having to take medication and to learn more about what is going on in my neck. (4-8 weeks) .     Long term goal: Patient is to self-manage symptoms and return to prior/improved function in 4-8 weeks.      Functional goals:   Patient is to be independent in their Home Exercise Program in 4 weeks.  Patient is to have improved cervical spine mobility to 65 degrees rotation left to allow for improved looking over her shoulder in 8 weeks.  Patient is report the ability to perform normal daily activities such as housework with proper pacing and rest breaks with minimal to no neck pain in 8 weeks.  Patient is to display and maintain normal joint mobility/symmetry in the thoracic spine to allow better support of the neck in 8 weeks.  Patient is to demonstrate decreased muscular pain and tightness on the left to assist her with self management in 8 weeks.       Response to Intervention:  Patient noting improvement with HEP. Noted her neck felt more mobile after session today. Joint crepitus noted with stretching. Improved facet and left first rib mobility. Very restricted in lower cervical/upper thoracic spine.       Plan    Treatment Plan / Targeted Outcomes:     Frequency:   8-12 visits     Duration of Treatment: 2 months    Planned Interventions:  May include any of the following:  Home Exercise Program development  Therapeutic Exercise (ROM & Strengthening)  Manual Therapy  Neuromuscular Re-education  Ultrasound  Electrical Stimulation  Manual  traction  Therapeutic Activities  Posture and Body Mechanics Education    Plan for next visit:  Patient notes she will be out of town on vacation for several weeks. She will schedule a follow up for when she returns. Will continue with MET, MFR, neural manipulation and exercise. She will continue with her HEP until then.    Student or PTA has been instructed in and demonstrates skills necessary to carry out above stated treatment plan: Yes    Thank you for your referral to [facility].  Please call with any questions, concerns or comments.  [phone]

## 2018-01-04 NOTE — DISCHARGE SUMMARY
Patient Information     Patient Name MRN Sex Edith Alford [id number] Female 1950      Discharge Summaries by Delma Matamoros PT at 2017 11:15 AM     Author:  Delma Matamoros PT Service:  (none) Author Type:  PT- Physical Therapist     Filed:  2017 11:20 AM Date of Service:  2017 11:15 AM Status:  Signed     :  Delma Matamoros PT (PT- Physical Therapist)            [facility]  Outpatient PT - Discharge Summary    Date of discharge: 2017     Patient Name: Edith Smith   YOB: 1950   Referring MD/Provider: Cyndie Andre MD  Medical and Treatment Diagnosis: Neck pain on left side  PT Treatment Diagnosis: left sided neck pain, degenerative disc disease/OA, limited ROM, postural dysfunction, myofascial restriction, neural restriction, facet, rib and uncovertebral joint restriction  Insurance: Medicare and [employer]  Start of Service: 17  Certification Dates: Start of Service: 17  Medicare/MA Re-Cert Due: 17            Dates of Service: 17    Thru:  3-8-17  Number of visits: 3           Reason for Discharge:  Goals partially met. Pt choosing to continue with home exercise. Felt confident that she would continue to improve with exercises at home. Her chart was held open since her last visit in the event that she felt she needed to schedule another session. She has failed to schedule further appointments.    Progress from Initial to Discharge (if applicable):  Decreased pain   Increased ROM     Comments: Refer to note from 3-8-17 for last assessment of pt status.    G Codes and Modifier taken from patient completing the PSFS assessment:   The Patient's G Code Goal would be Handling Objects     The Patient's Impairment, Limitation or Restriction Modifier would be best described as CI - 1% - 20% Impairment.     The Patient's status upon Discharge is unknown as pt has failed to return. However at her last appointment, she  had met the G code goal.        Further Recommendations:      Patient will continue with established home exercise program    Patient is discharged from Physical Therapy    Thank you for your referral to [facility].  Please call with any questions, concerns or comments.  [phone]

## 2018-01-26 ENCOUNTER — DOCUMENTATION ONLY (OUTPATIENT)
Dept: FAMILY MEDICINE | Facility: OTHER | Age: 68
End: 2018-01-26

## 2018-01-26 VITALS
HEART RATE: 66 BPM | WEIGHT: 135 LBS | SYSTOLIC BLOOD PRESSURE: 126 MMHG | DIASTOLIC BLOOD PRESSURE: 70 MMHG | SYSTOLIC BLOOD PRESSURE: 124 MMHG | WEIGHT: 133.8 LBS | DIASTOLIC BLOOD PRESSURE: 80 MMHG | TEMPERATURE: 98.3 F

## 2018-01-26 VITALS
HEIGHT: 65 IN | WEIGHT: 129.2 LBS | DIASTOLIC BLOOD PRESSURE: 62 MMHG | DIASTOLIC BLOOD PRESSURE: 74 MMHG | SYSTOLIC BLOOD PRESSURE: 130 MMHG | HEART RATE: 68 BPM | WEIGHT: 133.2 LBS | BODY MASS INDEX: 21.52 KG/M2 | HEART RATE: 76 BPM | SYSTOLIC BLOOD PRESSURE: 130 MMHG | HEIGHT: 64 IN | BODY MASS INDEX: 22.74 KG/M2

## 2018-01-26 PROBLEM — L82.1 SK (SEBORRHEIC KERATOSIS): Status: ACTIVE | Noted: 2017-08-29

## 2018-01-26 PROBLEM — E78.5 HYPERLIPIDEMIA: Status: ACTIVE | Noted: 2017-01-27

## 2018-01-26 RX ORDER — MULTIVIT WITH MINERALS/LUTEIN
1 TABLET ORAL DAILY
COMMUNITY

## 2018-02-05 ENCOUNTER — HISTORY (OUTPATIENT)
Dept: FAMILY MEDICINE | Facility: OTHER | Age: 68
End: 2018-02-05

## 2018-02-05 ENCOUNTER — OFFICE VISIT - GICH (OUTPATIENT)
Dept: FAMILY MEDICINE | Facility: OTHER | Age: 68
End: 2018-02-05

## 2018-02-05 DIAGNOSIS — Z12.31 ENCOUNTER FOR SCREENING MAMMOGRAM FOR MALIGNANT NEOPLASM OF BREAST: ICD-10-CM

## 2018-02-05 DIAGNOSIS — Z00.00 ENCOUNTER FOR GENERAL ADULT MEDICAL EXAMINATION WITHOUT ABNORMAL FINDINGS: ICD-10-CM

## 2018-02-05 DIAGNOSIS — E78.49 OTHER HYPERLIPIDEMIA: ICD-10-CM

## 2018-02-05 LAB
ANION GAP - HISTORICAL: 6 (ref 5–18)
BUN SERPL-MCNC: 19 MG/DL (ref 7–25)
BUN/CREAT RATIO - HISTORICAL: 29
CALCIUM SERPL-MCNC: 9.9 MG/DL (ref 8.6–10.3)
CHLORIDE SERPLBLD-SCNC: 104 MMOL/L (ref 98–107)
CHOL/HDL RATIO - HISTORICAL: 3.57
CHOLESTEROL TOTAL: 225 MG/DL
CO2 SERPL-SCNC: 32 MMOL/L (ref 21–31)
CREAT SERPL-MCNC: 0.66 MG/DL (ref 0.7–1.3)
GFR IF NOT AFRICAN AMERICAN - HISTORICAL: >60 ML/MIN/1.73M2
GLUCOSE SERPL-MCNC: 117 MG/DL (ref 70–105)
HDLC SERPL-MCNC: 63 MG/DL (ref 23–92)
LDLC SERPL CALC-MCNC: 148 MG/DL
NON-HDL CHOLESTEROL - HISTORICAL: 162 MG/DL
POTASSIUM SERPL-SCNC: 4.4 MMOL/L (ref 3.5–5.1)
PROVIDER ORDERDED STATUS - HISTORICAL: ABNORMAL
SODIUM SERPL-SCNC: 142 MMOL/L (ref 133–143)
TRIGL SERPL-MCNC: 68 MG/DL

## 2018-02-09 VITALS
SYSTOLIC BLOOD PRESSURE: 122 MMHG | WEIGHT: 133.2 LBS | HEART RATE: 64 BPM | HEIGHT: 66 IN | DIASTOLIC BLOOD PRESSURE: 66 MMHG | BODY MASS INDEX: 21.41 KG/M2

## 2018-02-13 NOTE — NURSING NOTE
Patient Information     Patient Name MRN Edith Reeves [id number] Female 1950      Nursing Note by Lalitha Moore at 2018  8:15 AM     Author:  Lalitha Moore Service:  (none) Author Type:  (none)     Filed:  2018  8:32 AM Encounter Date:  2018 Status:  Signed     :  Lalitha Moore            Patient here for yearly physical.  Lalitha Moore LPN ..........2018 8:14 AM

## 2018-02-13 NOTE — ADDENDUM NOTE
Patient Information     Patient Name MRN Sex Edith Alford [id number] Female 1950      Addendum Note by Yodit Yo MD at 2018 12:06 PM     Author:  Yodit Yo MD Service:  (none) Author Type:  Physician     Filed:  2018 12:06 PM Encounter Date:  2018 Status:  Signed     :  Yodit Yo MD (Physician)       Addended by: YODIT YO on: 2018 12:06 PM        Modules accepted: Orders

## 2018-02-13 NOTE — PROGRESS NOTES
Patient Information     Patient Name MRN Sex Edith King [id number] Female 1950      Progress Notes by Cyndie Andre MD at 2018  8:15 AM     Author:  Cyndie Andre MD Service:  (none) Author Type:  Physician     Filed:  2018 10:15 AM Encounter Date:  2018 Status:  Signed     :  Cyndie Andre MD (Physician)            ANNUAL PHYSICAL - FEMALE    HPI: Edith Smith is a 67 y.o. female who presents for a yearly exam.  Concerns include: L neck symptoms mostly improved following a few sessions of physical therapy.  Now with about 2 months of R shoulder pain.     No LMP recorded. Patient is postmenopausal.   Contraception: n/a  Risk for STI?: no  Last pap:   Any hx of abnormal paps:  no  FH of early CA?: no  Tobacco?: no  Calcium intake: yes  DEXA:   Last mammo: 2017  Colonoscopy:   Immunizations: up to date     Patient Active Problem List       Diagnosis  Date Noted     SK (seborrheic keratosis)  2017     Hyperlipidemia  2017     Osteopenia  01/10/2014     ACP (advance care planning)  2014     Carpal tunnel syndrome  2013     History of            Past Medical History:     Diagnosis  Date     Lyme disease 2012     Vaginal delivery     x 1        Past Surgical History:      Procedure  Laterality Date     CATARACT REMOVAL      Dr. Beltran. rt       CATARACT REMOVAL  10/2013    Dr. Walden, left       COLONOSCOPY SCREENING  9    Hyperplastic polyps only.  Next due        DILATION AND CURETTAGE      age 17       TUBAL LIGATION      age 38         Social History     Social History        Marital status:       Spouse name: N/A     Number of children:  N/A     Years of education:  N/A     Occupational History      Not on file.     Social History Main Topics          Smoking status:   Former Smoker      Types:  Cigarettes      Quit date:  2000      Smokeless tobacco:   Never Used      Alcohol use   3.5 oz/week     7 Standard  "drinks or equivalent per week        Comment: slightly       Drug use:   No      Sexual activity:   Not on file      Other Topics  Concern     Not on file      Social History Narrative     . 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    Enjoys gardening, regular exercise, outdoor activity.     1 grown son, no regular contact.     [place]                                    Family History       Problem   Relation Age of Onset     Cancer  Mother      lung CA,  age 65, borderline DM.       Other  Father       age 94, related to CVA, HTN       Other  Sister 71     osteopenia, uterine CA       Other  Sister      fibromyalgia,  age 77, multiple chronic health issues, unknown cause of death       Cancer-breast  No Family History        Current Outpatient Prescriptions       Medication  Sig Dispense Refill     CALCIUM CARBONATE/VITAMIN D2 (CALCIUM 600 + D ORAL) Take  by mouth. Take daily as directed on bottle       multivitamins-minerals-lutein (CENTRUM SILVER) Tab tablet Take 1 tablet by mouth once daily.       PYRIDOXINE HCL (VITAMIN B-6 ORAL) Take 1 Tab by mouth once daily.       No current facility-administered medications for this visit.      Medications have been reviewed by me and are current to the best of my knowledge and ability.       REVIEW OF SYSTEMS:  15 system ROS completed and negative other than: See HPI.    PHYSICAL EXAM:  /66 (Cuff Site: Right Arm, Position: Sitting, Cuff Size: Adult Regular)  Pulse 64  Ht 1.664 m (5' 5.5\")  Wt 60.4 kg (133 lb 3.2 oz)  BMI 21.83 kg/m2  CONSTITUTIONAL:  Alert, cooperative, NAD.  EYES: No scleral icterus.  PERRLA.  Conjunctiva clear.  ENT/MOUTH: External ears and nose normal.  TMs normal.  Moist mucous membranes. Oropharynx clear.    ENDO: No thyromegaly or thyroid nodules.  LYMPH:  No cervical or supraclavicular LA.    BREASTS: No skin " abnormalities, no erythema.  No discrete masses.  No nipple discharge, no axillary, supra- or infraclavicular LA.   CARDIOVASCULAR: Regular, S1, S2.  No S3 or S4.  No murmur/gallop/rub.  No peripheral edema.  RESPIRATORY: CTA bilaterally, no wheezes, rhonchi or rales.  GI: Bowel sounds wnl.  Soft, nontender, nondistended.  No masses or HSM.  No rebound or guarding.  : Vulva: normal, no lesions or discharge  Urethral meatus: normal size and location, no lesions or discharge  Urethra: no tenderness or masses  Bladder: no fullness or tenderness  Vagina: normal appearance, no abnormal discharge, no lesions.  No evidence of cystocele or rectocele.  Cervix: no cervical motion tenderness  Uterus: normal size and position, mobile, non-tender  Pap smear obtained: no  Adnexa: no palpable masses bilaterally  MSKEL: Grossly normal ROM.  No clubbing.  INTEGUMENTARY:  Warm, dry.  No rash noted on exposed skin.  NEUROLOGIC: Facies symmetric.  Grossly normal movement and tone.  No tremor.  PSYCHIATRIC: Affect normal.  Speech fluent.  Though content linear.     ASSESSMENT/PLAN:    ICD-10-CM    1. Health care maintenance Z00.00    2. Screening for breast cancer Z12.31 XR MAMMO BILAT SCREENING   3. Other hyperlipidemia E78.4 LIPID PANEL      BASIC METABOLIC PANEL      LIPID PANEL      BASIC METABOLIC PANEL     AHA 10y risk of 8+%. Patient feels lipids will be better today based on lifestyle changes. But if not, would be willing to start a statin.   Consider physical therapy for shoulder pain. Patient will contact us if wanting to schedule. Exercises provided.   Relevant cancer screening discussed.    Counseled on healthy diet, Calcium and vitamin D intake, and exercise.    Cyndie Andre MD

## 2018-02-13 NOTE — PATIENT INSTRUCTIONS
Patient Information     Patient Name MRN Sex Edith Alford [id number] Female 1950      Patient Instructions by Cynide Andre MD at 2018  9:01 AM     Author:  Cyndie Andre MD  Service:  (none) Author Type:  Physician     Filed:  2018  9:01 AM  Encounter Date:  2018 Status:  Addendum     :  Cyndie Andre MD (Physician)        Related Notes: Original Note by Cyndie Andre MD (Physician) filed at 2018  9:01 AM               Index Vincentian   Biceps Tendon Injury Exercises   Your healthcare provider may recommend exercises to help you heal. Talk to your healthcare provider or physical therapist about which exercises will best help you and how to do them correctly and safely.  You may do these exercises right away. If any exercise increases your pain, stop doing it. Avoid overhead lifting while your tendon is healing.    Active elbow flexion and extension: Gently bring the palm of the hand on your injured side up toward your shoulder, bending your elbow as much as you can. Then straighten your elbow as far as you can. Repeat 15 times. Do 2 sets of 15.    Biceps stretch: Stand facing a wall (about 6 inches, or 15 centimeters, away from the wall). Raise your injured arm out to your side and place the thumb side of your hand against the wall (palm down). Keep your arm straight. Rotate your body in the opposite direction of the raised arm until you feel a stretch in your biceps. Hold 15 seconds. Repeat 3 times.    Biceps curl: Stand and hold a 5- to 8-pound weight in your hand. If you do not have a weight, use a soup can or hammer. Bend your elbow and bring your hand (palm up) toward your shoulder. Hold 5 seconds. Slowly straighten your arm and return to your starting position. Do 2 sets of 8 to 12.    Single-arm shoulder flexion: Stand with your injured arm hanging down at your side. Keeping your arm straight, bring your arm forward and up toward the ceiling. Hold this position for 5 seconds.  Do 2 sets of 8 to 12. As this exercise becomes easier, add a weight.    Resisted shoulder internal rotation: Stand sideways next to a door with your injured arm closest to the door. Tie a knot in the end of the tubing and shut the knot in the door at waist level. Hold the other end of the tubing with the hand of your injured arm. Bend the elbow of your injured arm 90 degrees. Keeping your elbow in at your side, rotate your forearm across your body and then slowly back to the starting position. Make sure you keep your forearm parallel to the floor. Do 2 sets of 8 to 12.    Resisted shoulder external rotation: Stand sideways next to a door with your injured arm farther from the door. Tie a knot in the end of the tubing and shut the knot in the door at waist level. Hold the other end of the tubing with the hand of your injured arm. Rest the hand of your injured arm across your stomach. Keeping your elbow in at your side, rotate your arm outward and away from your waist. Slowly return your arm to the starting position. Make sure you keep your elbow bent 90 degrees and your forearm parallel to the floor. Repeat 10 times. Build up to 2 sets of 15.    Side-lying external rotation: Lie on your uninjured side with your injured arm at your side and your elbow bent 90 degrees. Keeping your elbow against your side, raise your forearm toward the ceiling and hold for 2 seconds. Slowly lower your arm. Do 2 sets of 15. You can start doing this exercise holding a soup can or light weight and gradually increase the weight as long as there is no pain.    Sleeper stretch: Lie on your injured side with your hips and knees flexed and your arm straight out in front of you. Bend the elbow on your injured side to a right angle so that your fingers are pointing toward the ceiling. Then use your other hand to gently push your arm down toward the floor. Keep your shoulder blades lightly squeezed together as you do this exercise. Hold the  stretch for 30 seconds. Repeat 3 times.  Developed by [employer].  Adult Advisor 2017.2 published by [employer].  Last modified: 2014-06-09  Last reviewed: 2017-04-19  This content is reviewed periodically and is subject to change as new health information becomes available. The information is intended to inform and educate and is not a replacement for medical evaluation, advice, diagnosis or treatment by a healthcare professional.  References   Adult Advisor 2017.2 Index    Copyright   2017 [employer], a division of [employer] All rights reserved.          Index Turkish   Rotator Cuff Injury Exercises   Your healthcare provider may recommend exercises to help you heal. Talk to your healthcare provider or physical therapist about which exercises will best help you and how to do them correctly and safely.  Do these exercises as soon as your healthcare provider says you can. Avoid any exercises that cause pain or discomfort.     Latissimus dorsi strengthening: Sit on a firm chair. Place your hands on the seat on either side of you. Lift your buttocks off the chair. Hold this position for 5 seconds and then relax. Repeat 15 times. Do 2 sets of 15.    Horizontal abduction: Lie on your stomach on a table or the edge of a bed with the arm on your injured side hanging down over the edge. Raise your arm out to the side, with your thumb pointed toward the ceiling, until your arm is parallel to the floor. Hold for 2 seconds and then lower it slowly. Start this exercise with no weight. As you get stronger, add a light weight or hold a soup can. Do 2 sets of 15.    Resisted shoulder external rotation: Stand sideways next to a door with your injured arm farther from the door. Tie a knot in the end of the tubing and shut the knot in the door at waist level. Hold the other end of the tubing with the hand of your injured arm. Rest the hand of your injured arm across your stomach. Keeping your elbow in at your  "side, rotate your arm outward and away from your waist. Slowly return your arm to the starting position. Make sure you keep your elbow bent 90 degrees and your forearm parallel to the floor. Repeat 10 times. Build up to 2 sets of 15.    Resisted shoulder internal rotation: Stand sideways next to a door with your injured arm closest to the door. Tie a knot in the end of the tubing and shut the knot in the door at waist level. Hold the other end of the tubing with the hand of your injured arm. Bend the elbow of your injured arm 90 degrees. Keeping your elbow in at your side, rotate your forearm across your body and then slowly back to the starting position. Make sure you keep your forearm parallel to the floor. Do 2 sets of 8 to 12.    Scaption: Stand with your arms at your sides and with your elbows straight. Slowly raise your arms to eye level. As you raise your arms, spread them apart so that they are only slightly in front of your body (at about a 30-degree angle to the front of your body). Point your thumbs toward the ceiling. Hold for 2 seconds and lower your arms slowly. Do 2 sets of 15. Progress to holding a soup can or light weight when you are doing the exercise and increase the weight as the exercise gets easier.    Side-lying external rotation: Lie on your uninjured side with your injured arm at your side and your elbow bent 90 degrees. Keeping your elbow against your side, raise your forearm toward the ceiling and hold for 2 seconds. Slowly lower your arm. Do 2 sets of 15. You can start doing this exercise holding a soup can or light weight and gradually increase the weight as long as there is no pain.    Push-up with a plus: Begin on the floor on your hands and knees. Keep your hands a shoulder width apart and lift your feet off the floor. Arch your back as high as possible and round your shoulders (this is the \"plus\" part or the exercise). Bend your elbows and lower your upper body to the floor. Return " to the starting position and arch your back again. Do 2 sets of 15.  Developed by [employer].  Adult Advisor 2017.2 published by [employer].  Last modified: 2016-04-29  Last reviewed: 2016-04-29  This content is reviewed periodically and is subject to change as new health information becomes available. The information is intended to inform and educate and is not a replacement for medical evaluation, advice, diagnosis or treatment by a healthcare professional.  References   Adult Advisor 2017.2 Index    Copyright   2017 [employer], a division of [employer] All rights reserved.

## 2018-02-13 NOTE — PROGRESS NOTES
Patient Information     Patient Name MRN Sex Edith Alford [id number] Female 1950      Progress Notes by Cyndie Andre MD at 2018  5:12 PM     Author:  Cyndie Andre MD Service:  (none) Author Type:  Physician     Filed:  2018  5:12 PM Encounter Date:  2018 Status:  Signed     :  Cyndie Andre MD (Physician)            Notified of results via [employer].

## 2018-02-19 ENCOUNTER — HOSPITAL ENCOUNTER (OUTPATIENT)
Dept: MAMMOGRAPHY | Facility: OTHER | Age: 68
Discharge: HOME | End: 2018-02-19
Attending: FAMILY MEDICINE | Admitting: FAMILY MEDICINE
Payer: MEDICARE

## 2018-02-19 ENCOUNTER — DOCUMENTATION ONLY (OUTPATIENT)
Dept: FAMILY MEDICINE | Facility: OTHER | Age: 68
End: 2018-02-19

## 2018-02-19 DIAGNOSIS — Z12.31 VISIT FOR SCREENING MAMMOGRAM: ICD-10-CM

## 2018-02-19 PROCEDURE — 77067 SCR MAMMO BI INCL CAD: CPT

## 2018-02-19 RX ORDER — ATORVASTATIN CALCIUM 20 MG/1
1 TABLET, FILM COATED ORAL DAILY
COMMUNITY
Start: 2018-02-05 | End: 2018-05-30

## 2018-02-21 DIAGNOSIS — E78.49 OTHER HYPERLIPIDEMIA: Primary | ICD-10-CM

## 2018-03-21 DIAGNOSIS — E78.49 OTHER HYPERLIPIDEMIA: ICD-10-CM

## 2018-03-21 LAB
CHOLEST SERPL-MCNC: 154 MG/DL
HDLC SERPL-MCNC: 66 MG/DL (ref 23–92)
LDLC SERPL CALC-MCNC: 78 MG/DL
NONHDLC SERPL-MCNC: 88 MG/DL
TRIGL SERPL-MCNC: 49 MG/DL

## 2018-03-21 PROCEDURE — 36415 COLL VENOUS BLD VENIPUNCTURE: CPT | Performed by: FAMILY MEDICINE

## 2018-03-21 PROCEDURE — 80061 LIPID PANEL: CPT | Performed by: FAMILY MEDICINE

## 2018-05-30 DIAGNOSIS — E78.49 OTHER HYPERLIPIDEMIA: Primary | ICD-10-CM

## 2018-06-05 RX ORDER — ATORVASTATIN CALCIUM 20 MG/1
TABLET, FILM COATED ORAL
Qty: 90 TABLET | Refills: 0 | Status: SHIPPED | OUTPATIENT
Start: 2018-06-05 | End: 2018-08-20

## 2018-06-05 NOTE — TELEPHONE ENCOUNTER
Lipitor  LOV-02/05/2018  Last Lipid-03/21/2018  Prescription refilled per RN Medication RefillPolrobely.................... Yahaira Capone .Claudia..................  6/5/2018   9:05 AM

## 2018-07-24 ENCOUNTER — HOSPITAL ENCOUNTER (OUTPATIENT)
Dept: GENERAL RADIOLOGY | Facility: OTHER | Age: 68
Discharge: HOME | End: 2018-07-24
Attending: NURSE PRACTITIONER | Admitting: NURSE PRACTITIONER
Payer: MEDICARE

## 2018-07-24 ENCOUNTER — OFFICE VISIT (OUTPATIENT)
Dept: FAMILY MEDICINE | Facility: OTHER | Age: 68
End: 2018-07-24
Attending: NURSE PRACTITIONER
Payer: MEDICARE

## 2018-07-24 VITALS
WEIGHT: 126 LBS | BODY MASS INDEX: 20.99 KG/M2 | SYSTOLIC BLOOD PRESSURE: 110 MMHG | HEIGHT: 65 IN | RESPIRATION RATE: 18 BRPM | TEMPERATURE: 97.9 F | HEART RATE: 68 BPM | DIASTOLIC BLOOD PRESSURE: 70 MMHG

## 2018-07-24 DIAGNOSIS — M72.2 PLANTAR FASCIITIS: ICD-10-CM

## 2018-07-24 DIAGNOSIS — M79.671 PAIN OF RIGHT HEEL: Primary | ICD-10-CM

## 2018-07-24 PROCEDURE — G0463 HOSPITAL OUTPT CLINIC VISIT: HCPCS | Mod: 25

## 2018-07-24 PROCEDURE — 99213 OFFICE O/P EST LOW 20 MIN: CPT | Performed by: NURSE PRACTITIONER

## 2018-07-24 PROCEDURE — G0463 HOSPITAL OUTPT CLINIC VISIT: HCPCS

## 2018-07-24 PROCEDURE — 73650 X-RAY EXAM OF HEEL: CPT | Mod: RT

## 2018-07-24 RX ORDER — PREDNISONE 20 MG/1
40 TABLET ORAL DAILY
Qty: 10 TABLET | Refills: 0 | Status: SHIPPED | OUTPATIENT
Start: 2018-07-24 | End: 2018-07-29

## 2018-07-24 ASSESSMENT — PAIN SCALES - GENERAL: PAINLEVEL: MILD PAIN (2)

## 2018-07-24 NOTE — PATIENT INSTRUCTIONS
Understanding Plantar Fasciitis    Plantar fasciitis is a condition that causes foot and heel pain. The plantar fascia is a tough band of tissue that runs across the bottom of the foot from the heel to the toes. This tissue pulls on the heel bone. It supports the arch of the foot as it pushes off the ground. If the tissue becomes irritated or red and swollen (inflamed), it is called plantar fasciitis.  How to say it  PLAN-tuhr fa-see-IY-[id number]   What causes plantar fasciitis?  Plantar fasciitis most often occurs from overusing the plantar fascia. The tissue may become damaged from activities that put repeated stress on the heel and foot. Or it may wear down over time with age and ankle stiffness. You are more likely to have plantar fasciitis if you:    Do activities that require a lot of running, jumping, or dancing    Have a job that requires being on your feet for long periods    Are overweight or obese    Have certain foot problems, such as a tight Achilles tendon, flat feet, or high arches    Often wear poorly fitting shoes  Symptoms of plantar fasciitis  The condition most often causes pain in the heel and the bottom of the foot. The pain may occur when you take your first steps in the morning. It may get better as you walk throughout the day. But as you continue to put weight on the foot, the pain often returns. Pain may also occur after standing or sitting for long periods.  Treating plantar fasciitis  Treatments for plantar fasciitis include:    Resting the foot. This involves limiting movements that make your foot hurt. You may also need to avoid certain sports and types of work for a time.    Using cold packs. Put an ice pack on the heel and foot to help reduce pain and swelling.    Taking pain medicines. Prescription and over-the-counter pain medicines can help relieve pain and swelling.    Using heel cups or foot inserts (orthotics). These are placed in the shoes to help support the heel or arch and  cushion the heel. You may also be told to buy proper-fitting shoes with good arch support and cushioned soles.    Taping the foot. This supports the arch and limits the movement of the plantar fascia to help relieve symptoms.    Wearing a night splint. This stretches the plantar fascia and leg muscles while you sleep. This may help relieve pain.    Doing exercises and physical therapy. These stretch and strengthen the plantar fascia and the muscles in the leg that support the heel and foot.    Getting shots of medicine into the foot. These may help relieve symptoms for a time.    Having surgery. This may be needed if other treatments fail to relieve symptoms. During surgery, the surgeon may partially cut the plantar fascia to release tension.  Possible complications of plantar fasciitis  Without proper care and treatment, healing may take longer than normal. Also, symptoms may continue or get worse. Over time, the plantar fascia may be damaged. This can make it hard to walk or even stand without pain.      Tylenol 650-1000 mg every 6-8 hours    Ibuprofen 600 mg every 6 hours as needed for pain    Ice 20 minutes on, 3-6 times a day    After day three of injury can use heat 20 minutes on 3-6 times a day.

## 2018-07-24 NOTE — PROGRESS NOTES
"Nursing Notes:   Jayshree Davidson, LPN  7/24/2018  1:31 PM  Signed  Patient presents to the clinic for right heel pain. States she has had pain in that heel on/off for the past 3 weeks. Was out in the garden today and move it and heard a \"pop\" sound. Since then has not been able to apply pressure to heel.   Jayshree Davidson, JOSE............. July 24, 2018 1:19 PM       SUBJECTIVE:   Edith Smith is a 68 year old female who presents to clinic today for the following health issues:    Musculoskeletal problem/pain      Duration: 3 weeks    Description  Location: RT foot/heel    Intensity:  moderate    Accompanying signs and symptoms: swelling    History  Previous similar problem: no   Previous evaluation:  none    Precipitating or alleviating factors:  Trauma or overuse: No Trauma, noted pain about 3 weeks ago, worse in the AM.  Aggravating factors include: standing, walking, climbing stairs and AM, first getting up    Therapies tried and outcome: rest/inactivity, ice and Ibuprofen    Wore tennis shoes. Hurt to walk, when wakes up in AM is worse. Today felt a pop, something let loose and has not been able to walk since.     Problem list and histories reviewed & adjusted, as indicated.  Additional history: as documented    Current Outpatient Prescriptions   Medication Sig Dispense Refill     atorvastatin (LIPITOR) 20 MG tablet TAKE ONE TABLET BY MOUTH ONCE DAILY 90 tablet 0     Multiple Vitamins-Minerals (CENTRUM SILVER) per tablet Take 1 tablet by mouth daily       pyridOXINE (VITAMIN B-6) 25 MG tablet        No Known Allergies      ROS:  Notable findings in the HPI.       OBJECTIVE:     /70 (BP Location: Left arm, Patient Position: Sitting, Cuff Size: Adult Regular)  Pulse 68  Temp 97.9  F (36.6  C) (Tympanic)  Resp 18  Ht 5' 5\" (1.651 m)  Wt 126 lb (57.2 kg)  Breastfeeding? No  BMI 20.97 kg/m2  Body mass index is 20.97 kg/(m^2).  GENERAL: healthy, alert and no distress  EYES: Eyes grossly " normal to inspection  RESP: With ease  MS: Pain to palpation over the heel, none over foot, ankle or [place] tendon. Able to move ankle, and toes without pain. Flexion and extension of the foot causes heel pain.      Diagnostic Test Results:  Results for orders placed or performed in visit on 07/24/18 (from the past 24 hour(s))   XR Calcaneus Right G/E 2 Views    Narrative    PROCEDURE: XR CALCANEUS RT G/E 2 VW 7/24/2018 2:32 PM    HISTORY: ; Pain of right heel    COMPARISONS: None.    TECHNIQUE: 3 views.    FINDINGS: No acute fracture or dislocation is seen. Tiny plantar  calcaneal spur is seen.         Impression    IMPRESSION: No acute fracture.    YODIT ARREGUIN MD     Completed heel xray.  I personally reviewed the xray. There was no acute fracture noted upon initial read of xray.  Final read pending by radiology.    ASSESSMENT/PLAN:     1. Pain of right heel  - XR Calcaneus Right G/E 2 Views  - predniSONE (DELTASONE) 20 MG tablet; Take 2 tablets (40 mg) by mouth daily for 5 days  Dispense: 10 tablet; Refill: 0  - PHYSICAL THERAPY REFERRAL  - order for DME; Equipment being ordered: Walking boot  Dispense: 1 Device; Refill: 0    2. Plantar fasciitis  - predniSONE (DELTASONE) 20 MG tablet; Take 2 tablets (40 mg) by mouth daily for 5 days  Dispense: 10 tablet; Refill: 0  - PHYSICAL THERAPY REFERRAL  - order for DME; Equipment being ordered: Walking boot  Dispense: 1 Device; Refill: 0    PLAN:    MS Injury/Pain  ice, heat, elevate, rest, stretching, Tylenol, Ibuprofen and PT eval and treat and f/u if needed with PCP. Walking boot given and she was able to walk out of the clinic. No need for crutches. F/u if needed.     Followup:    If not improving or if condition worsens, follow up with your Primary Care Provider    Disclaimer:  This note consists of words and symbols derived from keyboarding, dictation, or using voice recognition software. As a result, there may be errors in the script that have gone  undetected. Please consider this when interpreting information found in this note.      Alka Day NP, 7/24/2018 1:31 PM

## 2018-07-24 NOTE — MR AVS SNAPSHOT
After Visit Summary   7/24/2018    Edith Smith    MRN: [id number]           Patient Information     Date Of Birth          1950        Visit Information        Provider Department      7/24/2018 12:45 PM Alka Day NP [facility]        Today's Diagnoses     Pain of right heel    -  1    Plantar fasciitis          Care Instructions      Understanding Plantar Fasciitis    Plantar fasciitis is a condition that causes foot and heel pain. The plantar fascia is a tough band of tissue that runs across the bottom of the foot from the heel to the toes. This tissue pulls on the heel bone. It supports the arch of the foot as it pushes off the ground. If the tissue becomes irritated or red and swollen (inflamed), it is called plantar fasciitis.  How to say it  PLAN-tuhr fa-see-IY-tis   What causes plantar fasciitis?  Plantar fasciitis most often occurs from overusing the plantar fascia. The tissue may become damaged from activities that put repeated stress on the heel and foot. Or it may wear down over time with age and ankle stiffness. You are more likely to have plantar fasciitis if you:    Do activities that require a lot of running, jumping, or dancing    Have a job that requires being on your feet for long periods    Are overweight or obese    Have certain foot problems, such as a tight Achilles tendon, flat feet, or high arches    Often wear poorly fitting shoes  Symptoms of plantar fasciitis  The condition most often causes pain in the heel and the bottom of the foot. The pain may occur when you take your first steps in the morning. It may get better as you walk throughout the day. But as you continue to put weight on the foot, the pain often returns. Pain may also occur after standing or sitting for long periods.  Treating plantar fasciitis  Treatments for plantar fasciitis include:    Resting the foot. This involves limiting movements that make your foot hurt. You may  also need to avoid certain sports and types of work for a time.    Using cold packs. Put an ice pack on the heel and foot to help reduce pain and swelling.    Taking pain medicines. Prescription and over-the-counter pain medicines can help relieve pain and swelling.    Using heel cups or foot inserts (orthotics). These are placed in the shoes to help support the heel or arch and cushion the heel. You may also be told to buy proper-fitting shoes with good arch support and cushioned soles.    Taping the foot. This supports the arch and limits the movement of the plantar fascia to help relieve symptoms.    Wearing a night splint. This stretches the plantar fascia and leg muscles while you sleep. This may help relieve pain.    Doing exercises and physical therapy. These stretch and strengthen the plantar fascia and the muscles in the leg that support the heel and foot.    Getting shots of medicine into the foot. These may help relieve symptoms for a time.    Having surgery. This may be needed if other treatments fail to relieve symptoms. During surgery, the surgeon may partially cut the plantar fascia to release tension.  Possible complications of plantar fasciitis  Without proper care and treatment, healing may take longer than normal. Also, symptoms may continue or get worse. Over time, the plantar fascia may be damaged. This can make it hard to walk or even stand without pain.      Tylenol 650-1000 mg every 6-8 hours    Ibuprofen 600 mg every 6 hours as needed for pain    Ice 20 minutes on, 3-6 times a day    After day three of injury can use heat 20 minutes on 3-6 times a day.                     Follow-ups after your visit        Additional Services     PHYSICAL THERAPY REFERRAL       *This therapy referral will be filtered to a centralized scheduling office at [facility] and the patient will receive a call to schedule an appointment at a [place] location most convenient for them. *  "    [facility] provides Physical Therapy evaluation and treatment and many specialty services across the [facility] system.  If requesting a specialty program, please choose from the list below.    If you have not heard from the scheduling office within 2 business days, please call [phone] for all locations, with the exception of [place], please call [phone] and [facility], please call [phone]  Treatment: Evaluation & Treatment  Special Instructions/Modalities: Eval and treat heel and foot pain  Special Programs: None    Please be aware that coverage of these services is subject to the terms and limitations of your health insurance plan.  Call member services at your health plan with any benefit or coverage questions.      **Note to Provider:  If you are referring outside of [facility] for the therapy appointment, please list the name of the location in the \"special instructions\" above, print the referral and give to the patient to schedule the appointment.                  Follow-up notes from your care team     Return if symptoms worsen or fail to improve.      Who to contact     If you have questions or need follow up information about today's clinic visit or your schedule please contact [facility] directly at [phone].  Normal or non-critical lab and imaging results will be communicated to you by MyChart, letter or phone within 4 business days after the clinic has received the results. If you do not hear from us within 7 days, please contact the clinic through [facility] or phone. If you have a critical or abnormal lab result, we will notify you by phone as soon as possible.  Submit refill requests through [facility] or call your pharmacy and they will forward the refill request to us. Please allow 3 business days for your refill to be completed.          Additional Information About Your Visit        [facility] Information     [facility] gives you secure access to " "your electronic health record. If you see a primary care provider, you can also send messages to your care team and make appointments. If you have questions, please call your primary care clinic.  If you do not have a primary care provider, please call [phone] and they will assist you.        Care EveryWhere ID     This is your Care EveryWhere ID. This could be used by other organizations to access your [facility] medical records  [id number]        Your Vitals Were     Pulse Temperature Respirations Height Breastfeeding? BMI (Body Mass Index)    68 97.9  F (36.6  C) (Tympanic) 18 5' 5\" (1.651 m) No 20.97 kg/m2       Blood Pressure from Last 3 Encounters:   07/24/18 110/70   02/05/18 122/66   08/25/17 126/80    Weight from Last 3 Encounters:   07/24/18 126 lb (57.2 kg)   02/05/18 133 lb 3.2 oz (60.4 kg)   08/25/17 135 lb (61.2 kg)              We Performed the Following     PHYSICAL THERAPY REFERRAL     XR Calcaneus Right G/E 2 Views          Today's Medication Changes          These changes are accurate as of 7/24/18  2:35 PM.  If you have any questions, ask your nurse or doctor.               Start taking these medicines.        Dose/Directions    predniSONE 20 MG tablet   Commonly known as:  DELTASONE   Used for:  Pain of right heel, Plantar fasciitis   Started by:  Alka Day NP        Dose:  40 mg   Take 2 tablets (40 mg) by mouth daily for 5 days   Quantity:  10 tablet   Refills:  0            Where to get your medicines      These medications were sent to [facility] [address] [place] [id number]     Phone:  [phone]     predniSONE 20 MG tablet                Primary Care Provider Office Phone # Fax #    Cyndie Andre [phone]       [address] [facility] [address]        Equal Access to Services     SCARLETT MONTOYA AH: Hadii yue castaneda Sodaniele, waaxda luqadaha, qaybta kaaljahaira lam " ron husseinemmanuel corrydidier ling'aan ah. So [facility] [phone].    ATENCIÓN: Si megan valentino, tiene a calvo disposición servicios gratuitos de asistencia lingüística. Jones al [phone].    We comply with applicable federal civil rights laws and [place] laws. We do not discriminate on the basis of race, color, national origin, age, disability, sex, sexual orientation, or gender identity.            Thank you!     Thank you for choosing [facility]  for your care. Our goal is always to provide you with excellent care. Hearing back from our patients is one way we can continue to improve our services. Please take a few minutes to complete the written survey that you may receive in the mail after your visit with us. Thank you!             Your Updated Medication List - Protect others around you: Learn how to safely use, store and throw away your medicines at www.disposemymeds.org.          This list is accurate as of 7/24/18  2:35 PM.  Always use your most recent med list.                   Brand Name Dispense Instructions for use Diagnosis    atorvastatin 20 MG tablet    LIPITOR    90 tablet    TAKE ONE TABLET BY MOUTH ONCE DAILY    Other hyperlipidemia       CENTRUM SILVER per tablet      Take 1 tablet by mouth daily        predniSONE 20 MG tablet    DELTASONE    10 tablet    Take 2 tablets (40 mg) by mouth daily for 5 days    Pain of right heel, Plantar fasciitis       pyridOXINE 25 MG tablet    vitamin B-6

## 2018-07-24 NOTE — NURSING NOTE
"Patient presents to the clinic for right heel pain. States she has had pain in that heel on/off for the past 3 weeks. Was out in the garden today and move it and heard a \"pop\" sound. Since then has not been able to apply pressure to heel.   Jayshree Davidson LPN............. July 24, 2018 1:19 PM     "

## 2018-08-20 DIAGNOSIS — E78.49 OTHER HYPERLIPIDEMIA: ICD-10-CM

## 2018-08-21 RX ORDER — ATORVASTATIN CALCIUM 20 MG/1
TABLET, FILM COATED ORAL
Qty: 90 TABLET | Refills: 1 | Status: SHIPPED | OUTPATIENT
Start: 2018-08-21 | End: 2019-02-27

## 2018-08-21 NOTE — TELEPHONE ENCOUNTER
Lipitor  LOV-02/05/2018  Last Lipid -3/21/2018  Prescription refilled per RN Medication RefillPolrobely.................... Yahaira Capone ....................  8/21/2018   4:19 PM

## 2018-11-13 ENCOUNTER — TRANSFERRED RECORDS (OUTPATIENT)
Dept: HEALTH INFORMATION MANAGEMENT | Facility: OTHER | Age: 68
End: 2018-11-13

## 2018-11-13 LAB — HEMOCCULT STL QL IA: NEGATIVE

## 2019-01-14 ENCOUNTER — HOSPITAL ENCOUNTER (EMERGENCY)
Facility: OTHER | Age: 69
Discharge: HOME | End: 2019-01-14
Attending: PHYSICIAN ASSISTANT | Admitting: PHYSICIAN ASSISTANT
Payer: MEDICARE

## 2019-01-14 VITALS
HEART RATE: 70 BPM | SYSTOLIC BLOOD PRESSURE: 160 MMHG | WEIGHT: 130 LBS | RESPIRATION RATE: 16 BRPM | DIASTOLIC BLOOD PRESSURE: 92 MMHG | OXYGEN SATURATION: 95 % | TEMPERATURE: 98.1 F | BODY MASS INDEX: 21.66 KG/M2 | HEIGHT: 65 IN

## 2019-01-14 DIAGNOSIS — T41.45XA: ICD-10-CM

## 2019-01-14 LAB
ALBUMIN SERPL-MCNC: 4.6 G/DL (ref 3.5–5.7)
ALBUMIN UR-MCNC: NEGATIVE MG/DL
ALP SERPL-CCNC: 50 U/L (ref 34–104)
ALT SERPL W P-5'-P-CCNC: 25 U/L (ref 7–52)
ANION GAP SERPL CALCULATED.3IONS-SCNC: 7 MMOL/L (ref 3–14)
APPEARANCE UR: CLEAR
AST SERPL W P-5'-P-CCNC: 27 U/L (ref 13–39)
BASOPHILS # BLD AUTO: 0.1 10E9/L (ref 0–0.2)
BASOPHILS NFR BLD AUTO: 1.1 %
BILIRUB SERPL-MCNC: 0.7 MG/DL (ref 0.3–1)
BILIRUB UR QL STRIP: NEGATIVE
BUN SERPL-MCNC: 13 MG/DL (ref 7–25)
CALCIUM SERPL-MCNC: 9.6 MG/DL (ref 8.6–10.3)
CHLORIDE SERPL-SCNC: 103 MMOL/L (ref 98–107)
CO2 SERPL-SCNC: 28 MMOL/L (ref 21–31)
COLOR UR AUTO: YELLOW
CREAT SERPL-MCNC: 0.65 MG/DL (ref 0.6–1.2)
D DIMER PPP DDU-MCNC: <200 NG/ML D-DU (ref 0–230)
DIFFERENTIAL METHOD BLD: NORMAL
EOSINOPHIL # BLD AUTO: 0.1 10E9/L (ref 0–0.7)
EOSINOPHIL NFR BLD AUTO: 1.8 %
ERYTHROCYTE [DISTWIDTH] IN BLOOD BY AUTOMATED COUNT: 12.7 % (ref 10–15)
GFR SERPL CREATININE-BSD FRML MDRD: >90 ML/MIN/{1.73_M2}
GLUCOSE SERPL-MCNC: 106 MG/DL (ref 70–105)
GLUCOSE UR STRIP-MCNC: NEGATIVE MG/DL
HCT VFR BLD AUTO: 40.3 % (ref 35–47)
HGB BLD-MCNC: 13.7 G/DL (ref 11.7–15.7)
HGB UR QL STRIP: NEGATIVE
IMM GRANULOCYTES # BLD: 0 10E9/L (ref 0–0.4)
IMM GRANULOCYTES NFR BLD: 0.4 %
INR PPP: 0.92 (ref 0–1.3)
KETONES UR STRIP-MCNC: NEGATIVE MG/DL
LACTATE SERPL-SCNC: 1 MMOL/L (ref 0.5–2.2)
LEUKOCYTE ESTERASE UR QL STRIP: NEGATIVE
LYMPHOCYTES # BLD AUTO: 1.5 10E9/L (ref 0.8–5.3)
LYMPHOCYTES NFR BLD AUTO: 26.1 %
MAGNESIUM SERPL-MCNC: 2.2 MG/DL (ref 1.9–2.7)
MCH RBC QN AUTO: 32.6 PG (ref 26.5–33)
MCHC RBC AUTO-ENTMCNC: 34 G/DL (ref 31.5–36.5)
MCV RBC AUTO: 96 FL (ref 78–100)
MONOCYTES # BLD AUTO: 0.4 10E9/L (ref 0–1.3)
MONOCYTES NFR BLD AUTO: 7.4 %
NEUTROPHILS # BLD AUTO: 3.6 10E9/L (ref 1.6–8.3)
NEUTROPHILS NFR BLD AUTO: 63.2 %
NITRATE UR QL: NEGATIVE
PH UR STRIP: 7 PH (ref 5–9)
PLATELET # BLD AUTO: 215 10E9/L (ref 150–450)
POTASSIUM SERPL-SCNC: 4.3 MMOL/L (ref 3.5–5.1)
PROT SERPL-MCNC: 7.6 G/DL (ref 6.4–8.9)
RBC # BLD AUTO: 4.2 10E12/L (ref 3.8–5.2)
SODIUM SERPL-SCNC: 138 MMOL/L (ref 134–144)
SOURCE: NORMAL
SP GR UR STRIP: 1.01 (ref 1–1.03)
TROPONIN I SERPL-MCNC: <0.03 UG/L (ref 0–0.03)
UROBILINOGEN UR STRIP-ACNC: 0.2 EU/DL (ref 0.2–1)
WBC # BLD AUTO: 5.6 10E9/L (ref 4–11)

## 2019-01-14 PROCEDURE — 84484 ASSAY OF TROPONIN QUANT: CPT | Performed by: PHYSICIAN ASSISTANT

## 2019-01-14 PROCEDURE — 83735 ASSAY OF MAGNESIUM: CPT | Performed by: PHYSICIAN ASSISTANT

## 2019-01-14 PROCEDURE — 85610 PROTHROMBIN TIME: CPT | Mod: GZ | Performed by: PHYSICIAN ASSISTANT

## 2019-01-14 PROCEDURE — 81003 URINALYSIS AUTO W/O SCOPE: CPT | Performed by: PHYSICIAN ASSISTANT

## 2019-01-14 PROCEDURE — 83605 ASSAY OF LACTIC ACID: CPT | Performed by: PHYSICIAN ASSISTANT

## 2019-01-14 PROCEDURE — 96374 THER/PROPH/DIAG INJ IV PUSH: CPT | Performed by: PHYSICIAN ASSISTANT

## 2019-01-14 PROCEDURE — 99284 EMERGENCY DEPT VISIT MOD MDM: CPT | Mod: 25 | Performed by: PHYSICIAN ASSISTANT

## 2019-01-14 PROCEDURE — 36415 COLL VENOUS BLD VENIPUNCTURE: CPT | Performed by: PHYSICIAN ASSISTANT

## 2019-01-14 PROCEDURE — 25000128 H RX IP 250 OP 636: Performed by: PHYSICIAN ASSISTANT

## 2019-01-14 PROCEDURE — 85379 FIBRIN DEGRADATION QUANT: CPT | Performed by: PHYSICIAN ASSISTANT

## 2019-01-14 PROCEDURE — 96375 TX/PRO/DX INJ NEW DRUG ADDON: CPT | Performed by: PHYSICIAN ASSISTANT

## 2019-01-14 PROCEDURE — 93010 ELECTROCARDIOGRAM REPORT: CPT | Performed by: INTERNAL MEDICINE

## 2019-01-14 PROCEDURE — 99283 EMERGENCY DEPT VISIT LOW MDM: CPT | Mod: Z6 | Performed by: PHYSICIAN ASSISTANT

## 2019-01-14 PROCEDURE — 80053 COMPREHEN METABOLIC PANEL: CPT | Performed by: PHYSICIAN ASSISTANT

## 2019-01-14 PROCEDURE — 93005 ELECTROCARDIOGRAM TRACING: CPT | Performed by: PHYSICIAN ASSISTANT

## 2019-01-14 PROCEDURE — 85025 COMPLETE CBC W/AUTO DIFF WBC: CPT | Performed by: PHYSICIAN ASSISTANT

## 2019-01-14 RX ORDER — DIPHENHYDRAMINE HYDROCHLORIDE 50 MG/ML
25 INJECTION INTRAMUSCULAR; INTRAVENOUS ONCE
Status: COMPLETED | OUTPATIENT
Start: 2019-01-14 | End: 2019-01-14

## 2019-01-14 RX ORDER — METHYLPREDNISOLONE SODIUM SUCCINATE 125 MG/2ML
125 INJECTION, POWDER, LYOPHILIZED, FOR SOLUTION INTRAMUSCULAR; INTRAVENOUS ONCE
Status: COMPLETED | OUTPATIENT
Start: 2019-01-14 | End: 2019-01-14

## 2019-01-14 RX ADMIN — DIPHENHYDRAMINE HYDROCHLORIDE 25 MG: 50 INJECTION INTRAMUSCULAR; INTRAVENOUS at 12:04

## 2019-01-14 RX ADMIN — METHYLPREDNISOLONE SODIUM SUCCINATE 125 MG: 125 INJECTION, POWDER, FOR SOLUTION INTRAMUSCULAR; INTRAVENOUS at 12:04

## 2019-01-14 ASSESSMENT — ENCOUNTER SYMPTOMS
BACK PAIN: 0
HEMATURIA: 0
BRUISES/BLEEDS EASILY: 0
CONFUSION: 0
ADENOPATHY: 0
CHILLS: 0
ABDOMINAL PAIN: 0
CHEST TIGHTNESS: 0
FEVER: 0
SHORTNESS OF BREATH: 0
WOUND: 0
TREMORS: 1

## 2019-01-14 ASSESSMENT — MIFFLIN-ST. JEOR: SCORE: 1110.56

## 2019-01-14 NOTE — ED PROVIDER NOTES
History     Chief Complaint   Patient presents with     Neurologic Problem     This is a 68-year-old female who was at her dentist today he had received 2 injections for a lower block on the left side.  Patient left eye then apparently went numb and she had difficulty blinking it or moving it.  She became somewhat nervous and tremorous.  She is here for further evaluation.  She reports that most of her symptoms have improved at this time.  Denies any fever or chills.  No cough, sore throat, or shortness of breath.  No lightheadedness or dizziness.  No nausea or vomiting.  The  Patient does report that she was very nervous about the whole procedure as well.  And she does have some anxiety.              Problem List:    Patient Active Problem List    Diagnosis Date Noted     SK (seborrheic keratosis) 2017     Priority: Medium     Hyperlipidemia 2017     Priority: Medium     Osteopenia 01/10/2014     Priority: Medium     ACP (advance care planning) 2014     Priority: Medium     Carpal tunnel syndrome 2013     Priority: Medium     Overview:   History of          Past Medical History:    Past Medical History:   Diagnosis Date     Encounter for full-term uncomplicated delivery      Lyme disease        Past Surgical History:    Past Surgical History:   Procedure Laterality Date     COLONOSCOPY  2011,Hyperplastic polyps only.  Next due      DILATION AND CURETTAGE      age 17     EXTRACAPSULAR CATARACT EXTRATION WITH INTRAOCULAR LENS IMPLANT      ,Dr. Beltran. rt     EXTRACAPSULAR CATARACT EXTRATION WITH INTRAOCULAR LENS IMPLANT      10/2013,Dr. Walden, left     LAPAROSCOPIC TUBAL LIGATION      age 38       Family History:    Family History   Problem Relation Age of Onset     Cancer Mother         Cancer,lung CA,  age 65, borderline DM.     Other - See Comments Father          age 94, related to CVA, HTN     Other - See Comments Sister 71        osteopenia, uterine CA  "    Other - See Comments Sister         fibromyalgia,  age 77, multiple chronic health issues, unknown cause of death     Breast Cancer No family hx of         Cancer-breast       Social History:  Marital Status:   [2]  Social History     Tobacco Use     Smoking status: Former Smoker     Types: Cigarettes     Last attempt to quit: 2000     Years since quittin.1     Smokeless tobacco: Never Used   Substance Use Topics     Alcohol use: Yes     Alcohol/week: 3.5 oz     Comment: Alcoholic Drinks/day: slightly     Drug use: Unknown     Types: Other     Comment: Drug use: No        Medications:      atorvastatin (LIPITOR) 20 MG tablet   Multiple Vitamins-Minerals (CENTRUM SILVER) per tablet   order for DME   pyridOXINE (VITAMIN B-6) 25 MG tablet         Review of Systems   Constitutional: Negative for chills and fever.   HENT: Negative for congestion.    Eyes: Negative for visual disturbance.   Respiratory: Negative for chest tightness and shortness of breath.    Cardiovascular: Negative for chest pain.   Gastrointestinal: Negative for abdominal pain.   Genitourinary: Negative for hematuria.   Musculoskeletal: Negative for back pain.   Skin: Negative for rash and wound.   Neurological: Positive for tremors. Negative for syncope.   Hematological: Negative for adenopathy. Does not bruise/bleed easily.   Psychiatric/Behavioral: Negative for confusion.   All other systems reviewed and are negative.      Physical Exam   BP: (!) 168/91  Pulse: 78  Temp: 98.1  F (36.7  C)  Resp: 16  Height: 165.1 cm (5' 5\")  Weight: 59 kg (130 lb)  SpO2: 98 %  Vitals:    19 1320 19 1325 19 1330 19 1335   BP:       Pulse:       Resp:       Temp:       TempSrc:       SpO2: 97% 95% 96% 95%   Weight:       Height:         .neur  Physical Exam   Constitutional: She is oriented to person, place, and time. She appears well-developed and well-nourished. No distress.   HENT:   Head: Normocephalic and " atraumatic.   Mouth/Throat: Oropharynx is clear and moist. No oropharyngeal exudate.   Eyes: Conjunctivae are normal. Pupils are equal, round, and reactive to light. No scleral icterus.   Neck: Neck supple.   Cardiovascular: Normal rate, regular rhythm, normal heart sounds and intact distal pulses.   Pulmonary/Chest: Effort normal and breath sounds normal. No respiratory distress.   Abdominal: Soft. Bowel sounds are normal. There is no tenderness.   Musculoskeletal: She exhibits no edema, tenderness or deformity.   Lymphadenopathy:     She has no cervical adenopathy.   Neurological: She is alert and oriented to person, place, and time. She displays tremor. No cranial nerve deficit or sensory deficit. She exhibits normal muscle tone. She displays no seizure activity. Coordination normal. GCS eye subscore is 4. GCS verbal subscore is 5. GCS motor subscore is 6.   Reflex Scores:       Tricep reflexes are 2+ on the right side and 2+ on the left side.       Bicep reflexes are 2+ on the right side and 2+ on the left side.       Brachioradialis reflexes are 2+ on the right side and 2+ on the left side.       Patellar reflexes are 2+ on the right side and 2+ on the left side.       Achilles reflexes are 2+ on the right side and 2+ on the left side.  No focal neurological deficits   Skin: Skin is warm and dry. No rash noted. She is not diaphoretic.   Psychiatric: She has a normal mood and affect.     NEURO:  equal  strength, neg Romberg, DTR II/IV bilaterally (UE and LE), finger to nose normal, CN intact, ambulates without difficulty.  no focal deficits noted.    ED Course        Procedures          EKG shows a normal sinus rhythm.  Cannot rule out inferior infarct, age undetermined.  Heart rate is 74.  This is unchanged from her previous EKG on 3/2/2011.      Results for orders placed or performed during the hospital encounter of 01/14/19 (from the past 24 hour(s))   CBC with platelets differential   Result Value Ref  Range    WBC 5.6 4.0 - 11.0 10e9/L    RBC Count 4.20 3.8 - 5.2 10e12/L    Hemoglobin 13.7 11.7 - 15.7 g/dL    Hematocrit 40.3 35.0 - 47.0 %    MCV 96 78 - 100 fl    MCH 32.6 26.5 - 33.0 pg    MCHC 34.0 31.5 - 36.5 g/dL    RDW 12.7 10.0 - 15.0 %    Platelet Count 215 150 - 450 10e9/L    Diff Method Automated Method     % Neutrophils 63.2 %    % Lymphocytes 26.1 %    % Monocytes 7.4 %    % Eosinophils 1.8 %    % Basophils 1.1 %    % Immature Granulocytes 0.4 %    Absolute Neutrophil 3.6 1.6 - 8.3 10e9/L    Absolute Lymphocytes 1.5 0.8 - 5.3 10e9/L    Absolute Monocytes 0.4 0.0 - 1.3 10e9/L    Absolute Eosinophils 0.1 0.0 - 0.7 10e9/L    Absolute Basophils 0.1 0.0 - 0.2 10e9/L    Abs Immature Granulocytes 0.0 0 - 0.4 10e9/L   D-Dimer (HI,GH)   Result Value Ref Range    D-Dimer ng/mL <200 0 - 230 ng/ml D-DU   INR   Result Value Ref Range    INR 0.92 0 - 1.3   Comprehensive metabolic panel   Result Value Ref Range    Sodium 138 134 - 144 mmol/L    Potassium 4.3 3.5 - 5.1 mmol/L    Chloride 103 98 - 107 mmol/L    Carbon Dioxide 28 21 - 31 mmol/L    Anion Gap 7 3 - 14 mmol/L    Glucose 106 (H) 70 - 105 mg/dL    Urea Nitrogen 13 7 - 25 mg/dL    Creatinine 0.65 0.60 - 1.20 mg/dL    GFR Estimate >90 >60 mL/min/[1.73_m2]    GFR Estimate If Black >90 >60 mL/min/[1.73_m2]    Calcium 9.6 8.6 - 10.3 mg/dL    Bilirubin Total 0.7 0.3 - 1.0 mg/dL    Albumin 4.6 3.5 - 5.7 g/dL    Protein Total 7.6 6.4 - 8.9 g/dL    Alkaline Phosphatase 50 34 - 104 U/L    ALT 25 7 - 52 U/L    AST 27 13 - 39 U/L   Lactic acid   Result Value Ref Range    Lactic Acid 1.0 0.5 - 2.2 mmol/L   Troponin I   Result Value Ref Range    Troponin I ES <0.030 0.000 - 0.034 ug/L   Magnesium   Result Value Ref Range    Magnesium 2.2 1.9 - 2.7 mg/dL   *UA reflex to Microscopic   Result Value Ref Range    Color Urine Yellow     Appearance Urine Clear     Glucose Urine Negative NEG^Negative mg/dL    Bilirubin Urine Negative NEG^Negative    Ketones Urine Negative  NEG^Negative mg/dL    Specific Gravity Urine 1.010 1.000 - 1.030    Blood Urine Negative NEG^Negative    pH Urine 7.0 5.0 - 9.0 pH    Protein Albumin Urine Negative NEG^Negative mg/dL    Urobilinogen Urine 0.2 0.2 - 1.0 EU/dL    Nitrite Urine Negative NEG^Negative    Leukocyte Esterase Urine Negative NEG^Negative    Source Urine        Medications   methylPREDNISolone sodium succinate (solu-MEDROL) injection 125 mg (125 mg Intravenous Given 1/14/19 1204)   diphenhydrAMINE (BENADRYL) injection 25 mg (25 mg Intravenous Given 1/14/19 1204)       Assessments & Plan (with Medical Decision Making)     I have reviewed the nursing notes.    I have reviewed the findings, diagnosis, plan and need for follow up with the patient.         Medication List      There are no discharge medications for this visit.         Final diagnoses:   Adverse reaction to anesthetic agent, initial encounter   Afebrile.  Vital signs stable.  Some noted left eye drooping and tremors after receiving 2 dental nerve blocks.  These symptoms have resolved at this time.  No focal neurological findings.  IV established and she was given fluids, Solu-Medrol, and Benadryl.  EKG shows a normal sinus rhythm.  Cannot rule out inferior infarct, age undetermined.  Heart rate is 74.  This is unchanged from her previous EKG on 3/2/2011.  Troponin is normal.  D-dimer is normal.  CMP is normal supple.  Lactate is normal.  Magnesium exam is normal UA shows no signs of UTI or hematuria.  She was observed over extended timeframe with no signs of deteriorating neurological deficits.  Patient reports she is feeling much better.  I discussed this could be an adverse reaction to the nerve block versus TIA.  Discussed continued monitoring and close follow-up if symptoms worsen for further evaluation.  1/14/2019   [facility]     Juan Pablo Marti PA-C  01/14/19 [id number]

## 2019-01-14 NOTE — ED AVS SNAPSHOT
[facility]  [address]  [place] [address]  Phone:  [phone]  Fax:  [phone]                                    Edith Smith   MRN: [id number]    Department:  [facility]   Date of Visit:  1/14/2019           After Visit Summary Signature Page    I have received my discharge instructions, and my questions have been answered. I have discussed any challenges I see with this plan with the nurse or doctor.    ..........................................................................................................................................  Patient/Patient Representative Signature      ..........................................................................................................................................  Patient Representative Print Name and Relationship to Patient    ..................................................               ................................................  Date                                   Time    ..........................................................................................................................................  Reviewed by Signature/Title    ...................................................              ..............................................  Date                                               Time          22EPIC Rev 08/18

## 2019-01-14 NOTE — ED TRIAGE NOTES
Patient arrived to ER in personal vehicle. Patient was sent from the dental office. Patient got two injections for a lower block on left side. Patients left eye went numb and couldn't blink eye. Patient was shaking. Patient a/o and denies other symtoms. Neuro assessment negative.

## 2019-01-18 ENCOUNTER — DOCUMENTATION ONLY (OUTPATIENT)
Dept: OTHER | Facility: CLINIC | Age: 69
End: 2019-01-18

## 2019-02-27 ENCOUNTER — OFFICE VISIT (OUTPATIENT)
Dept: FAMILY MEDICINE | Facility: OTHER | Age: 69
End: 2019-02-27
Attending: FAMILY MEDICINE
Payer: COMMERCIAL

## 2019-02-27 VITALS
TEMPERATURE: 98.3 F | SYSTOLIC BLOOD PRESSURE: 124 MMHG | RESPIRATION RATE: 20 BRPM | BODY MASS INDEX: 22.73 KG/M2 | DIASTOLIC BLOOD PRESSURE: 72 MMHG | HEIGHT: 65 IN | HEART RATE: 64 BPM | WEIGHT: 136.4 LBS

## 2019-02-27 DIAGNOSIS — E78.49 OTHER HYPERLIPIDEMIA: ICD-10-CM

## 2019-02-27 DIAGNOSIS — Z12.31 ENCOUNTER FOR SCREENING MAMMOGRAM FOR BREAST CANCER: Primary | ICD-10-CM

## 2019-02-27 DIAGNOSIS — M85.89 OSTEOPENIA OF MULTIPLE SITES: ICD-10-CM

## 2019-02-27 LAB
CHOLEST SERPL-MCNC: 182 MG/DL
HDLC SERPL-MCNC: 77 MG/DL (ref 23–92)
LDLC SERPL CALC-MCNC: 92 MG/DL
NONHDLC SERPL-MCNC: 105 MG/DL
TRIGL SERPL-MCNC: 64 MG/DL

## 2019-02-27 PROCEDURE — G0463 HOSPITAL OUTPT CLINIC VISIT: HCPCS

## 2019-02-27 PROCEDURE — 99397 PER PM REEVAL EST PAT 65+ YR: CPT | Mod: GY | Performed by: FAMILY MEDICINE

## 2019-02-27 PROCEDURE — 36415 COLL VENOUS BLD VENIPUNCTURE: CPT | Performed by: FAMILY MEDICINE

## 2019-02-27 PROCEDURE — 80061 LIPID PANEL: CPT | Performed by: FAMILY MEDICINE

## 2019-02-27 RX ORDER — IBUPROFEN 200 MG
2 CAPSULE ORAL DAILY
COMMUNITY

## 2019-02-27 RX ORDER — ATORVASTATIN CALCIUM 20 MG/1
20 TABLET, FILM COATED ORAL DAILY
Qty: 90 TABLET | Refills: 3 | Status: SHIPPED | OUTPATIENT
Start: 2019-02-27 | End: 2020-02-20

## 2019-02-27 ASSESSMENT — PAIN SCALES - GENERAL: PAINLEVEL: NO PAIN (0)

## 2019-02-27 ASSESSMENT — MIFFLIN-ST. JEOR: SCORE: 1130.62

## 2019-02-27 NOTE — PROGRESS NOTES
SUBJECTIVE:   Edith Smith is a 69 year old female who presents to clinic today for a physical:     HPI    Contraception: n/a  Risk for STI?: no  Last pap:   Any hx of abnormal paps:  no  FH of early CA?: no  Tobacco?: no  Calcium intake: yes  DEXA:   Last mammo: 2018  Colonoscopy: , follow up in 10 years  Immunizations: consider shingrix, otherwise up to date        Patient Active Problem List    Diagnosis Date Noted     SK (seborrheic keratosis) 2017     Priority: Medium     Hyperlipidemia 2017     Priority: Medium     Osteopenia 01/10/2014     Priority: Medium     Carpal tunnel syndrome 2013     Priority: Medium     Overview:   History of       Past Medical History:   Diagnosis Date     Encounter for full-term uncomplicated delivery     x 1     Lyme disease     2012      Past Surgical History:   Procedure Laterality Date     COLONOSCOPY  2011,Hyperplastic polyps only.  Next due      DILATION AND CURETTAGE      age 17     EXTRACAPSULAR CATARACT EXTRATION WITH INTRAOCULAR LENS IMPLANT      ,Dr. Beltran. rt     EXTRACAPSULAR CATARACT EXTRATION WITH INTRAOCULAR LENS IMPLANT      10/2013,Dr. Walden, left     LAPAROSCOPIC TUBAL LIGATION      age 38     Family History   Problem Relation Age of Onset     Cancer Mother         Cancer,lung CA,  age 65, borderline DM.     Other - See Comments Father          age 94, related to CVA, HTN     No Known Problems Brother      Other - See Comments Sister 71        osteopenia, uterine CA     Other - See Comments Sister         fibromyalgia,  age 77, multiple chronic health issues, unknown cause of death     Breast Cancer No family hx of         Cancer-breast     Social History     Tobacco Use     Smoking status: Former Smoker     Types: Cigarettes     Last attempt to quit: 2000     Years since quittin.2     Smokeless tobacco: Never Used   Substance Use Topics     Alcohol use: Yes      "Alcohol/week: 3.5 oz     Comment: Alcoholic Drinks/day: slightly     Social History     Social History Narrative    . 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  Enjoys gardening, regular exercise, outdoor activity.   1 grown son, no regular contact.   [place]              Review of Systems see HPI, ROS otherwise negative.     OBJECTIVE:     /72 (BP Location: Right arm, Patient Position: Sitting, Cuff Size: Adult Regular)   Pulse 64   Temp 98.3  F (36.8  C) (Tympanic)   Resp 20   Ht 1.645 m (5' 4.75\")   Wt 61.9 kg (136 lb 6.4 oz)   BMI 22.87 kg/m    Body mass index is 22.87 kg/m .  Physical Exam   Constitutional: She is oriented to person, place, and time. She appears well-developed and well-nourished.   HENT:   TMs appear normal.    Eyes: Conjunctivae are normal.   Neck: No thyromegaly present.   Cardiovascular: Normal rate, regular rhythm and normal heart sounds.   No murmur heard.  Pulmonary/Chest: Effort normal and breath sounds normal. No respiratory distress.   Abdominal: Soft.   Musculoskeletal: She exhibits no edema.   Lymphadenopathy:     She has no cervical adenopathy.   Neurological: She is alert and oriented to person, place, and time.   Skin: No rash noted.   Psychiatric: She has a normal mood and affect.           ASSESSMENT/PLAN:           ICD-10-CM    1. Encounter for screening mammogram for breast cancer Z12.31 MA Screen Bilateral w/Maik   2. Other hyperlipidemia E78.49 atorvastatin (LIPITOR) 20 MG tablet     Lipid Panel     Lipid Panel   3. Osteopenia of multiple sites M85.89 DX Hip/Pelvis/Spine     Lipid screening today.  Did review labs from recent ED visit, none repeated today.  Patient will return for a mammogram and bone density scan.  She is due for a colonoscopy in 2 years, could consider Cologuard screening at that time.  Reviewed new shingles vaccine, patient encouraged to " get this when available.  Vaccines otherwise up-to-date.    Relevant cancer screening discussed.    Counseled on healthy diet, Calcium and vitamin D intake, and exercise.      Cyndie Andre MD  [facility] AND [facility] [place]

## 2019-02-27 NOTE — NURSING NOTE
"Patient here for yearly physical. Has a bit of a sore throat.   Lalitha Moore LPN ..........2/27/2019 12:44 PM   Chief Complaint   Patient presents with     Physical     yearly       Initial /72 (BP Location: Right arm, Patient Position: Sitting, Cuff Size: Adult Regular)   Pulse 64   Temp 98.3  F (36.8  C) (Tympanic)   Resp 20   Ht 1.645 m (5' 4.75\")   Wt 61.9 kg (136 lb 6.4 oz)   BMI 22.87 kg/m   Estimated body mass index is 22.87 kg/m  as calculated from the following:    Height as of this encounter: 1.645 m (5' 4.75\").    Weight as of this encounter: 61.9 kg (136 lb 6.4 oz).  Medication Reconciliation: complete    Lalitha Moore LPN    "

## 2019-04-03 ENCOUNTER — HOSPITAL ENCOUNTER (OUTPATIENT)
Dept: MAMMOGRAPHY | Facility: OTHER | Age: 69
End: 2019-04-03
Attending: FAMILY MEDICINE
Payer: MEDICARE

## 2019-04-03 ENCOUNTER — HOSPITAL ENCOUNTER (OUTPATIENT)
Dept: BONE DENSITY | Facility: OTHER | Age: 69
Discharge: HOME | End: 2019-04-03
Attending: FAMILY MEDICINE | Admitting: FAMILY MEDICINE
Payer: MEDICARE

## 2019-04-03 DIAGNOSIS — Z12.31 ENCOUNTER FOR SCREENING MAMMOGRAM FOR BREAST CANCER: ICD-10-CM

## 2019-04-03 DIAGNOSIS — M85.89 OSTEOPENIA OF MULTIPLE SITES: ICD-10-CM

## 2019-04-03 PROCEDURE — 77080 DXA BONE DENSITY AXIAL: CPT

## 2019-04-03 PROCEDURE — 77063 BREAST TOMOSYNTHESIS BI: CPT

## 2019-04-25 ENCOUNTER — OFFICE VISIT (OUTPATIENT)
Dept: FAMILY MEDICINE | Facility: OTHER | Age: 69
End: 2019-04-25
Attending: NURSE PRACTITIONER
Payer: COMMERCIAL

## 2019-04-25 VITALS
TEMPERATURE: 97.6 F | BODY MASS INDEX: 23.18 KG/M2 | DIASTOLIC BLOOD PRESSURE: 80 MMHG | HEART RATE: 70 BPM | RESPIRATION RATE: 18 BRPM | WEIGHT: 138.2 LBS | SYSTOLIC BLOOD PRESSURE: 138 MMHG

## 2019-04-25 DIAGNOSIS — S50.11XA CONTUSION OF RIGHT LOWER ARM, INITIAL ENCOUNTER: Primary | ICD-10-CM

## 2019-04-25 PROCEDURE — G0463 HOSPITAL OUTPT CLINIC VISIT: HCPCS

## 2019-04-25 PROCEDURE — 99213 OFFICE O/P EST LOW 20 MIN: CPT | Performed by: NURSE PRACTITIONER

## 2019-04-25 ASSESSMENT — PAIN SCALES - GENERAL: PAINLEVEL: NO PAIN (0)

## 2019-04-25 NOTE — PROGRESS NOTES
HPI:    Edith Smith is a 69 year old female  who presents to clinic today for arm bruise.    States last evening she was leaning her right forearm against the window frame and later noted a non tender bruise.  This morning the bruise became more swollen then flattened and larger.  The bruise remains non tender.  No numbness or tingling in the right arm.  No weakness in the right arm.  States hx of easy bruising.  She has some minor bruises on her right upper arm she states she got from just pulling her sleeve up and down.  Denies any fevers,chills, joint aches, body aches, muscle weakness, joint weakness, fatigue, chest pain, palpitations, shortness of breath or other concerns today.  Denies any tick bite or spider bite.  No anticoagulant use.      Past Medical History:   Diagnosis Date     Encounter for full-term uncomplicated delivery     x 1     Lyme disease     2012     Past Surgical History:   Procedure Laterality Date     COLONOSCOPY  2011,Hyperplastic polyps only.  Next due      DILATION AND CURETTAGE      age 17     EXTRACAPSULAR CATARACT EXTRATION WITH INTRAOCULAR LENS IMPLANT      ,Dr. Beltran. rt     EXTRACAPSULAR CATARACT EXTRATION WITH INTRAOCULAR LENS IMPLANT      10/2013,Dr. Walden, left     LAPAROSCOPIC TUBAL LIGATION      age 38     Social History     Tobacco Use     Smoking status: Former Smoker     Types: Cigarettes     Last attempt to quit: 2000     Years since quittin.4     Smokeless tobacco: Never Used   Substance Use Topics     Alcohol use: Yes     Alcohol/week: 3.5 oz     Comment: Alcoholic Drinks/day: slightly     Current Outpatient Medications   Medication Sig Dispense Refill     atorvastatin (LIPITOR) 20 MG tablet Take 1 tablet (20 mg) by mouth daily 90 tablet 3     calcium carbonate (OS-GREER) 500 MG tablet Take 2 tablets by mouth daily       Multiple Vitamins-Minerals (CENTRUM SILVER) per tablet Take 1 tablet by mouth daily       pyridOXINE  (VITAMIN B-6) 25 MG tablet        No Known Allergies      Past medical history, past surgical history, current medications and allergies reviewed and accurate to the best of my knowledge.        ROS:  Refer to HPI    /80 (BP Location: Left arm, Patient Position: Sitting, Cuff Size: Adult Regular)   Pulse 70   Temp 97.6  F (36.4  C) (Tympanic)   Resp 18   Wt 62.7 kg (138 lb 3.2 oz)   BMI 23.18 kg/m      EXAM:  General Appearance: Well appearing young elderly female, appropriate appearance for age. No acute distress  Eyes: conjunctivae normal without erythema or irritation, no drainage or crusting, no eyelid swelling, pupils equal   Respiratory: normal chest wall and respirations.  Normal effort. No cough appreciated.   Cardiovascular:  CMS intact to right upper extremity, brisk capillary refill, strong radial pulse   Musculoskeletal:  Right wrist and elbow without swelling or tenderness to palpation.  Right forearm without tenderness to palpation.  Right right with full passive and active ROM without guarding or discomfort.  Right elbow without full passive and active ROM without guarding or discomfort.  Right forearm without tenderness to palpation.  Normal gait.  Equal movement of bilateral upper extremities.  Equal movement of bilateral lower extremities.    Dermatological: Right proximal forearm just below the elbow along the posterior side with 4 cm x 5.5 cm circular erythematous and purple contusion, skin intact, no central clearing, no pustule, no vesicle, no abrasion, no warmth, no surrounding erythema, no linear erythema.  Right lower deltoid area with few scattered petechial bruises present.    Psychological: normal affect, alert and pleasant      Labs:  Not clinically indicated today.  Reviewed most recent CBC - 1/14/19 - normal results         ASSESSMENT/PLAN:  1. Contusion of right lower arm, initial encounter    Contusion without indication of superficial or deep thrombosis or infection.  Non  tender on exam to palpation.    Discussed trying mild warm or cool compresses to area of contusion.  Avoid any further injury or pressure to area of contusion.    Discussed warning signs/symptoms indicative of need to f/u including but not limited to warmth, redness, drainage, opening in skin, pain, numbness, tingling, increased area of contusion, weakness in extremity, etc    Follow up with PCP if symptoms persist or worsen or concerns

## 2019-04-25 NOTE — NURSING NOTE
"Chief Complaint   Patient presents with     Derm Problem     bruise on right arm        Initial /80 (BP Location: Left arm, Patient Position: Sitting, Cuff Size: Adult Regular)   Pulse 70   Temp 97.6  F (36.4  C) (Tympanic)   Resp 18   Wt 62.7 kg (138 lb 3.2 oz)   BMI 23.18 kg/m   Estimated body mass index is 23.18 kg/m  as calculated from the following:    Height as of 2/27/19: 1.645 m (5' 4.75\").    Weight as of this encounter: 62.7 kg (138 lb 3.2 oz).  Medication Reconciliation: complete    Phyllis Santos LPN  "

## 2020-02-20 DIAGNOSIS — E78.49 OTHER HYPERLIPIDEMIA: ICD-10-CM

## 2020-02-20 RX ORDER — ATORVASTATIN CALCIUM 20 MG/1
TABLET, FILM COATED ORAL
Qty: 90 TABLET | Refills: 0 | Status: SHIPPED | OUTPATIENT
Start: 2020-02-20 | End: 2020-02-28

## 2020-02-20 NOTE — TELEPHONE ENCOUNTER
"Requested Prescriptions   Pending Prescriptions Disp Refills     ATORVASTATIN 20 MG PO tablet [Pharmacy Med Name: Atorvastatin Calcium 20 MG Oral Tablet]  0     Sig: TAKE 1 TABLET BY MOUTH ONCE DAILY       Statins Protocol Passed - 2/20/2020  9:08 AM        Passed - LDL on file in past 12 months     Recent Labs   Lab Test 02/27/19  1346   LDL 92             Passed - No abnormal creatine kinase in past 12 months     No lab results found.             Passed - Recent (12 mo) or future (30 days) visit within the authorizing provider's specialty     Patient has had an office visit with the authorizing provider or a provider within the authorizing providers department within the previous 12 mos or has a future within next 30 days. See \"Patient Info\" tab in inbasket, or \"Choose Columns\" in Meds & Orders section of the refill encounter.              Passed - Medication is active on med list        Passed - Patient is age 18 or older        Passed - No active pregnancy on record        Passed - No positive pregnancy test in past 12 months        lov 04/25/2019  Prescription approved per [facility] Refill Protocol.    "

## 2020-02-28 ENCOUNTER — OFFICE VISIT (OUTPATIENT)
Dept: FAMILY MEDICINE | Facility: OTHER | Age: 70
End: 2020-02-28
Attending: FAMILY MEDICINE
Payer: COMMERCIAL

## 2020-02-28 VITALS
SYSTOLIC BLOOD PRESSURE: 122 MMHG | TEMPERATURE: 97.6 F | BODY MASS INDEX: 22.69 KG/M2 | RESPIRATION RATE: 18 BRPM | HEIGHT: 65 IN | WEIGHT: 136.2 LBS | DIASTOLIC BLOOD PRESSURE: 62 MMHG | HEART RATE: 64 BPM

## 2020-02-28 DIAGNOSIS — E78.49 OTHER HYPERLIPIDEMIA: ICD-10-CM

## 2020-02-28 DIAGNOSIS — Z00.00 HEALTH CARE MAINTENANCE: Primary | ICD-10-CM

## 2020-02-28 DIAGNOSIS — R73.01 IFG (IMPAIRED FASTING GLUCOSE): ICD-10-CM

## 2020-02-28 LAB
ALBUMIN SERPL-MCNC: 4.9 G/DL (ref 3.5–5.7)
ALP SERPL-CCNC: 49 U/L (ref 34–104)
ALT SERPL W P-5'-P-CCNC: 20 U/L (ref 7–52)
ANION GAP SERPL CALCULATED.3IONS-SCNC: 9 MMOL/L (ref 3–14)
AST SERPL W P-5'-P-CCNC: 28 U/L (ref 13–39)
BILIRUB SERPL-MCNC: 0.9 MG/DL (ref 0.3–1)
BUN SERPL-MCNC: 21 MG/DL (ref 7–25)
CALCIUM SERPL-MCNC: 9.7 MG/DL (ref 8.6–10.3)
CHLORIDE SERPL-SCNC: 104 MMOL/L (ref 98–107)
CHOLEST SERPL-MCNC: 195 MG/DL
CO2 SERPL-SCNC: 29 MMOL/L (ref 21–31)
CREAT SERPL-MCNC: 0.7 MG/DL (ref 0.6–1.2)
GFR SERPL CREATININE-BSD FRML MDRD: 82 ML/MIN/{1.73_M2}
GLUCOSE SERPL-MCNC: 125 MG/DL (ref 70–105)
HDLC SERPL-MCNC: 81 MG/DL (ref 23–92)
LDLC SERPL CALC-MCNC: 96 MG/DL
NONHDLC SERPL-MCNC: 114 MG/DL
POTASSIUM SERPL-SCNC: 4.1 MMOL/L (ref 3.5–5.1)
PROT SERPL-MCNC: 7.9 G/DL (ref 6.4–8.9)
SODIUM SERPL-SCNC: 142 MMOL/L (ref 134–144)
TRIGL SERPL-MCNC: 92 MG/DL

## 2020-02-28 PROCEDURE — 80053 COMPREHEN METABOLIC PANEL: CPT | Mod: ZL | Performed by: FAMILY MEDICINE

## 2020-02-28 PROCEDURE — 99397 PER PM REEVAL EST PAT 65+ YR: CPT | Performed by: FAMILY MEDICINE

## 2020-02-28 PROCEDURE — G0463 HOSPITAL OUTPT CLINIC VISIT: HCPCS

## 2020-02-28 PROCEDURE — 80061 LIPID PANEL: CPT | Mod: ZL | Performed by: FAMILY MEDICINE

## 2020-02-28 PROCEDURE — 36415 COLL VENOUS BLD VENIPUNCTURE: CPT | Mod: ZL | Performed by: FAMILY MEDICINE

## 2020-02-28 RX ORDER — ATORVASTATIN CALCIUM 20 MG/1
20 TABLET, FILM COATED ORAL DAILY
Qty: 90 TABLET | Refills: 3 | Status: SHIPPED | OUTPATIENT
Start: 2020-02-28 | End: 2021-04-07

## 2020-02-28 ASSESSMENT — PAIN SCALES - GENERAL: PAINLEVEL: NO PAIN (0)

## 2020-02-28 ASSESSMENT — MIFFLIN-ST. JEOR: SCORE: 1128.68

## 2020-02-28 NOTE — PROGRESS NOTES
SUBJECTIVE:   Edith Smith is a 70 year old female who presents to clinic today for a physical:     HPI    Contraception: n/a  Risk for STI?: no  Last pap:   Any hx of abnormal paps:  no  FH of early CA?: no  Tobacco?: no  Calcium intake: yes  DEXA: , plan repeat in   Last mammo: 2019  Colonoscopy: , follow up in 10 years, consider cologuard.   Immunizations: consider shingrix, otherwise up to date. patient concerned about insurance coverage.       Patient Active Problem List    Diagnosis Date Noted     SK (seborrheic keratosis) 2017     Priority: Medium     Hyperlipidemia 2017     Priority: Medium     Osteopenia 01/10/2014     Priority: Medium     Carpal tunnel syndrome 2013     Priority: Medium     Overview:   History of       Past Medical History:   Diagnosis Date     Encounter for full-term uncomplicated delivery     x 1     Lyme disease     2012      Past Surgical History:   Procedure Laterality Date     COLONOSCOPY  2011    Hyperplastic polyps only.  Next due      DILATION AND CURETTAGE      age 17     EXTRACAPSULAR CATARACT EXTRATION WITH INTRAOCULAR LENS IMPLANT      ,Dr. Beltran. rt     EXTRACAPSULAR CATARACT EXTRATION WITH INTRAOCULAR LENS IMPLANT      10/2013,Dr. Walden, left     LAPAROSCOPIC TUBAL LIGATION      age 38     Family History   Problem Relation Age of Onset     Cancer Mother         Cancer,lung CA,  age 65, borderline DM.     Other - See Comments Father          age 94, related to CVA, HTN     No Known Problems Brother      Other - See Comments Sister 71        osteopenia, uterine CA     Other - See Comments Sister         fibromyalgia,  age 77, multiple chronic health issues, unknown cause of death     Breast Cancer No family hx of         Cancer-breast     Social History     Tobacco Use     Smoking status: Former Smoker     Types: Cigarettes     Last attempt to quit: 2000     Years since quittin.2     Smokeless  "tobacco: Never Used   Substance Use Topics     Alcohol use: Yes     Alcohol/week: 5.8 standard drinks     Comment: Alcoholic Drinks/day: slightly     Social History     Social History Narrative    . 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  Enjoys gardening, regular exercise, outdoor activity.   1 grown son, no regular contact.   [place]              Review of Systems  See HPI, All other systems reviewed and are otherwise negative.       OBJECTIVE:     /62 (BP Location: Right arm, Patient Position: Sitting, Cuff Size: Adult Regular)   Pulse 64   Temp 97.6  F (36.4  C) (Tympanic)   Resp 18   Ht 1.651 m (5' 5\")   Wt 61.8 kg (136 lb 3.2 oz)   LMP  (LMP Unknown)   BMI 22.66 kg/m    Body mass index is 22.66 kg/m .  Physical Exam  Constitutional:       Appearance: She is well-developed.   Eyes:      Conjunctiva/sclera: Conjunctivae normal.   Neck:      Thyroid: No thyromegaly.   Cardiovascular:      Rate and Rhythm: Normal rate and regular rhythm.      Heart sounds: Normal heart sounds. No murmur.   Pulmonary:      Effort: Pulmonary effort is normal. No respiratory distress.      Breath sounds: Normal breath sounds.   Abdominal:      Palpations: Abdomen is soft.   Lymphadenopathy:      Cervical: No cervical adenopathy.   Skin:     Findings: No rash.   Neurological:      Mental Status: She is alert and oriented to person, place, and time.           ASSESSMENT/PLAN:           ICD-10-CM    1. Health care maintenance Z00.00    2. Other hyperlipidemia E78.49 atorvastatin (LIPITOR) 20 MG tablet     Lipid Panel     Comprehensive Metabolic Panel     Lipid Panel     Comprehensive Metabolic Panel     Labs today.  Medications refilled.  Plan for repeat DEXA scan next year.  Mammogram in April.  Consider Cologuard when due for colon cancer screening next year.  She will look into Shingrix coverage.    Relevant " cancer screening discussed.    Counseled on healthy diet, Calcium and vitamin D intake, and exercise.      Cyndie Andre MD  [facility] AND [facility]   never used

## 2020-02-28 NOTE — NURSING NOTE
"Patient here for yearly physical.   Lalitha Moore LPN ..........2/28/2020 9:06 AM   Chief Complaint   Patient presents with     Physical     yearly        Initial /62 (BP Location: Right arm, Patient Position: Sitting, Cuff Size: Adult Regular)   Pulse 64   Temp 97.6  F (36.4  C) (Tympanic)   Resp 18   Ht 1.651 m (5' 5\")   Wt 61.8 kg (136 lb 3.2 oz)   LMP  (LMP Unknown)   BMI 22.66 kg/m   Estimated body mass index is 22.66 kg/m  as calculated from the following:    Height as of this encounter: 1.651 m (5' 5\").    Weight as of this encounter: 61.8 kg (136 lb 3.2 oz).  Medication Reconciliation: complete    Laltiha Moore LPN    "

## 2020-03-04 DIAGNOSIS — R73.01 IFG (IMPAIRED FASTING GLUCOSE): ICD-10-CM

## 2020-03-04 LAB — HBA1C MFR BLD: 5.2 % (ref 4–6)

## 2020-03-04 PROCEDURE — 83036 HEMOGLOBIN GLYCOSYLATED A1C: CPT | Mod: ZL | Performed by: FAMILY MEDICINE

## 2020-03-04 PROCEDURE — 36415 COLL VENOUS BLD VENIPUNCTURE: CPT | Mod: ZL | Performed by: FAMILY MEDICINE

## 2020-09-08 ENCOUNTER — OFFICE VISIT (OUTPATIENT)
Dept: FAMILY MEDICINE | Facility: OTHER | Age: 70
End: 2020-09-08
Attending: NURSE PRACTITIONER
Payer: COMMERCIAL

## 2020-09-08 VITALS
HEART RATE: 109 BPM | RESPIRATION RATE: 16 BRPM | DIASTOLIC BLOOD PRESSURE: 68 MMHG | SYSTOLIC BLOOD PRESSURE: 124 MMHG | BODY MASS INDEX: 23.03 KG/M2 | WEIGHT: 138.4 LBS | OXYGEN SATURATION: 97 % | TEMPERATURE: 98.6 F

## 2020-09-08 DIAGNOSIS — R21 RASH: ICD-10-CM

## 2020-09-08 DIAGNOSIS — M25.511 ACUTE PAIN OF RIGHT SHOULDER: Primary | ICD-10-CM

## 2020-09-08 PROCEDURE — 99214 OFFICE O/P EST MOD 30 MIN: CPT | Performed by: NURSE PRACTITIONER

## 2020-09-08 PROCEDURE — 86617 LYME DISEASE ANTIBODY: CPT | Mod: ZL | Performed by: NURSE PRACTITIONER

## 2020-09-08 PROCEDURE — 86618 LYME DISEASE ANTIBODY: CPT | Mod: ZL | Performed by: NURSE PRACTITIONER

## 2020-09-08 PROCEDURE — 36415 COLL VENOUS BLD VENIPUNCTURE: CPT | Mod: ZL | Performed by: NURSE PRACTITIONER

## 2020-09-08 PROCEDURE — G0463 HOSPITAL OUTPT CLINIC VISIT: HCPCS

## 2020-09-08 RX ORDER — TRAMADOL HYDROCHLORIDE 50 MG/1
50 TABLET ORAL EVERY 6 HOURS PRN
Qty: 10 TABLET | Refills: 0 | Status: SHIPPED | OUTPATIENT
Start: 2020-09-08 | End: 2020-09-11

## 2020-09-08 RX ORDER — CYCLOBENZAPRINE HCL 10 MG
5 TABLET ORAL 3 TIMES DAILY PRN
Qty: 10 TABLET | Refills: 0 | Status: SHIPPED | OUTPATIENT
Start: 2020-09-08 | End: 2021-04-07

## 2020-09-08 RX ORDER — DOXYCYCLINE HYCLATE 100 MG
100 TABLET ORAL 2 TIMES DAILY
Qty: 28 TABLET | Refills: 0 | Status: SHIPPED | OUTPATIENT
Start: 2020-09-08 | End: 2020-09-22

## 2020-09-08 ASSESSMENT — PAIN SCALES - GENERAL: PAINLEVEL: EXTREME PAIN (8)

## 2020-09-08 NOTE — PATIENT INSTRUCTIONS
Doxycycline twice daily for 14 days  Lyme test is pending    Tramadol 3 times daily with acetaminophen for severe pain  Use tylenol 650 mg up to 6 times daily  May alternate with ibuprofen 400-600 mg  Flexeril 1/5-1 tablet 3 times daily as needed  Heat or ice to shoulder area  Consider chiropractor care    Follow up as needed

## 2020-09-08 NOTE — NURSING NOTE
"Chief Complaint   Patient presents with     Shoulder     10 days ago woke up with right shoulder pain     Derm Problem     Rash groin area       Patient presents today in clinic for the following right shoulder pain and rash in the groin area.    Initial /68   Pulse 109   Temp 98.6  F (37  C)   Resp 16   Wt 62.8 kg (138 lb 6.4 oz)   LMP  (LMP Unknown)   SpO2 97%   BMI 23.03 kg/m   Estimated body mass index is 23.03 kg/m  as calculated from the following:    Height as of 2/28/20: 1.651 m (5' 5\").    Weight as of this encounter: 62.8 kg (138 lb 6.4 oz).  Medication Reconciliation: complete    YESENIA, FLINCK, LPN  "

## 2020-09-08 NOTE — PROGRESS NOTES
HPI:    Edith Smith is a 70 year old female who presents to clinic today for left shoulder pain and rash.  She reports about 10 days ago she woke up and had pain between her shoulder blades that radiated into her right arm and elbow.  She finds that her pain is worse at night although she is not clear if this is just because she is thinking about it more.  She is having difficulty sleeping on her sides due to the pain.  Taking ibuprofen and Tylenol with some relief.  She also reports a rash to her right groin area.  This is slightly itchy.  She reports similar to when she had shingles in the past.  It was tender.  Using anti-itch cream.  She has had shingles in the past as well as the shingles vaccine.    Past Medical History:   Diagnosis Date     Encounter for full-term uncomplicated delivery     x 1     Lyme disease     7/13/2012         Current Outpatient Medications   Medication Sig Dispense Refill     atorvastatin (LIPITOR) 20 MG tablet Take 1 tablet (20 mg) by mouth daily 90 tablet 3     calcium carbonate (OS-GREER) 500 MG tablet Take 2 tablets by mouth daily       cyclobenzaprine (FLEXERIL) 10 MG tablet Take 0.5 tablets (5 mg) by mouth 3 times daily as needed for muscle spasms 10 tablet 0     doxycycline hyclate (VIBRA-TABS) 100 MG tablet Take 1 tablet (100 mg) by mouth 2 times daily for 14 days 28 tablet 0     Multiple Vitamins-Minerals (CENTRUM SILVER) per tablet Take 1 tablet by mouth daily       pyridOXINE (VITAMIN B-6) 25 MG tablet        traMADol (ULTRAM) 50 MG tablet Take 1 tablet (50 mg) by mouth every 6 hours as needed for severe pain 10 tablet 0       No Known Allergies    ROS:  Pertinent positives and negatives are noted in HPI.    EXAM:  /68   Pulse 109   Temp 98.6  F (37  C)   Resp 16   Wt 62.8 kg (138 lb 6.4 oz)   LMP  (LMP Unknown)   SpO2 97%   BMI 23.03 kg/m    General appearance: well appearing female, in no acute distress  Musculoskeletal: Tender over right trapezius with  palpation, muscles do feel tight  Dermatological: Light pink macular rash to right groin measuring 25 x 30 cm and another lesion measuring 2.5 x 4 cm.  She has several smaller lesions to her lower back and right side.  Psychological: normal affect, alert and pleasant    ASSESSMENT AND PLAN:    1. Acute pain of right shoulder    2. Rash      Acute right shoulder pain into the trapezius as well as rash to her right groin.  I do not suspect that this is shingles based on history and exam.  Lyme test is pending.  Treated with doxycycline twice daily for 14 days for suspected Lyme disease.  Encouraged Tylenol or ibuprofen for pain management.  Short course of tramadol was given as well as Flexeril.  I do not suspect that the shoulder pain and rash are related.  Would recommend chiropractic care as well as heat or ice to shoulder.  Follow-up as needed.    LEONARD Travis CNP..................9/8/2020 10:26 AM      This document was prepared using voice generated software.  While every attempt was made for accuracy, grammatical errors may exist.

## 2020-09-09 LAB — B BURGDOR IGG+IGM SER QL: >12.4 (ref 0–0.89)

## 2020-09-12 LAB
B BURGDOR IGG SER QL IB: POSITIVE
B BURGDOR IGM SER QL IB: POSITIVE

## 2020-09-14 ENCOUNTER — TELEPHONE (OUTPATIENT)
Dept: FAMILY MEDICINE | Facility: OTHER | Age: 70
End: 2020-09-14

## 2020-09-14 DIAGNOSIS — Z00.00 HEALTH CARE MAINTENANCE: Primary | ICD-10-CM

## 2020-09-14 NOTE — TELEPHONE ENCOUNTER
She should complete the course of doxy. This is the treatment for lyme disease. It is very treatable if detected in this early stage, which it certainly sounds like hers was.     The level of elevation of the lyme antibody titer is not important clinically. This is the screening test that triggers the lab to do a confirmatory test. The confirmatory test was also positive.     Typically no further evaluation is needed.     I will send in an order to her pharmacy for the shingles vaccine, but there is still a shortage so she will be put on a waiting list. She should check in with her pharmacy about this.

## 2020-09-14 NOTE — TELEPHONE ENCOUNTER
Has questions regarding test results on her My Chart. - not understand and wanting to known what the next step is.

## 2020-09-14 NOTE — TELEPHONE ENCOUNTER
Was [facility] in [facility] due to several problems, 8/30 woke up with pain on arm, had a rash up her leg. During the week got worst and she came into the clinic.   She is curious what degree of lymes she has as the number came back as >12.40 and range 0.00-0.89. She feels this is quite high.  By the time she came into the clinic she started to small round rashes up her stomach.  She was given antibiotics, muscle relaxer and pain medication. Now most of the rash is gone and pain has gone down to almost nothing.    She is trying to figure out where she is at with this illness is there more she needs to do or that the Dr needs to do. What is the follow up plan on this.      Also when she was having the pain it reminded her of the pain that she had shingles.  She does want to go ahead with the new shingles immunization and wants order sent to her pharmacy.   Lalitha Moore LPN ..........9/14/2020 10:01 AM

## 2021-01-03 ENCOUNTER — HEALTH MAINTENANCE LETTER (OUTPATIENT)
Age: 71
End: 2021-01-03

## 2021-04-07 ENCOUNTER — OFFICE VISIT (OUTPATIENT)
Dept: FAMILY MEDICINE | Facility: OTHER | Age: 71
End: 2021-04-07
Attending: FAMILY MEDICINE
Payer: MEDICARE

## 2021-04-07 ENCOUNTER — HOSPITAL ENCOUNTER (OUTPATIENT)
Dept: MAMMOGRAPHY | Facility: OTHER | Age: 71
End: 2021-04-07
Attending: FAMILY MEDICINE
Payer: MEDICARE

## 2021-04-07 VITALS
HEART RATE: 68 BPM | DIASTOLIC BLOOD PRESSURE: 68 MMHG | SYSTOLIC BLOOD PRESSURE: 122 MMHG | WEIGHT: 138.2 LBS | BODY MASS INDEX: 23.03 KG/M2 | RESPIRATION RATE: 18 BRPM | HEIGHT: 65 IN | TEMPERATURE: 98.4 F | OXYGEN SATURATION: 99 %

## 2021-04-07 DIAGNOSIS — E78.49 OTHER HYPERLIPIDEMIA: ICD-10-CM

## 2021-04-07 DIAGNOSIS — Z12.31 VISIT FOR SCREENING MAMMOGRAM: ICD-10-CM

## 2021-04-07 DIAGNOSIS — Z12.11 COLON CANCER SCREENING: ICD-10-CM

## 2021-04-07 DIAGNOSIS — M85.80 OSTEOPENIA, UNSPECIFIED LOCATION: ICD-10-CM

## 2021-04-07 DIAGNOSIS — Z78.0 ASYMPTOMATIC MENOPAUSAL STATE: ICD-10-CM

## 2021-04-07 DIAGNOSIS — R73.01 IFG (IMPAIRED FASTING GLUCOSE): Primary | ICD-10-CM

## 2021-04-07 LAB
ALBUMIN SERPL-MCNC: 4.6 G/DL (ref 3.5–5.7)
ALP SERPL-CCNC: 47 U/L (ref 34–104)
ALT SERPL W P-5'-P-CCNC: 22 U/L (ref 7–52)
ANION GAP SERPL CALCULATED.3IONS-SCNC: 8 MMOL/L (ref 3–14)
AST SERPL W P-5'-P-CCNC: 26 U/L (ref 13–39)
BILIRUB SERPL-MCNC: 1 MG/DL (ref 0.3–1)
BUN SERPL-MCNC: 16 MG/DL (ref 7–25)
CALCIUM SERPL-MCNC: 9.8 MG/DL (ref 8.6–10.3)
CHLORIDE SERPL-SCNC: 103 MMOL/L (ref 98–107)
CHOLEST SERPL-MCNC: 177 MG/DL
CO2 SERPL-SCNC: 29 MMOL/L (ref 21–31)
CREAT SERPL-MCNC: 0.66 MG/DL (ref 0.6–1.2)
GFR SERPL CREATININE-BSD FRML MDRD: 88 ML/MIN/{1.73_M2}
GLUCOSE SERPL-MCNC: 112 MG/DL (ref 70–105)
HBA1C MFR BLD: 5.3 % (ref 4–6)
HDLC SERPL-MCNC: 67 MG/DL (ref 23–92)
LDLC SERPL CALC-MCNC: 94 MG/DL
NONHDLC SERPL-MCNC: 110 MG/DL
POTASSIUM SERPL-SCNC: 4.1 MMOL/L (ref 3.5–5.1)
PROT SERPL-MCNC: 7.2 G/DL (ref 6.4–8.9)
SODIUM SERPL-SCNC: 140 MMOL/L (ref 134–144)
TRIGL SERPL-MCNC: 81 MG/DL

## 2021-04-07 PROCEDURE — 80053 COMPREHEN METABOLIC PANEL: CPT | Mod: ZL | Performed by: FAMILY MEDICINE

## 2021-04-07 PROCEDURE — 80061 LIPID PANEL: CPT | Mod: ZL | Performed by: FAMILY MEDICINE

## 2021-04-07 PROCEDURE — 36415 COLL VENOUS BLD VENIPUNCTURE: CPT | Mod: ZL | Performed by: FAMILY MEDICINE

## 2021-04-07 PROCEDURE — G0463 HOSPITAL OUTPT CLINIC VISIT: HCPCS

## 2021-04-07 PROCEDURE — 83036 HEMOGLOBIN GLYCOSYLATED A1C: CPT | Mod: ZL | Performed by: FAMILY MEDICINE

## 2021-04-07 PROCEDURE — 77063 BREAST TOMOSYNTHESIS BI: CPT

## 2021-04-07 PROCEDURE — 99397 PER PM REEVAL EST PAT 65+ YR: CPT | Performed by: FAMILY MEDICINE

## 2021-04-07 RX ORDER — ATORVASTATIN CALCIUM 20 MG/1
20 TABLET, FILM COATED ORAL DAILY
Qty: 90 TABLET | Refills: 3 | Status: SHIPPED | OUTPATIENT
Start: 2021-04-07 | End: 2022-04-13

## 2021-04-07 ASSESSMENT — MIFFLIN-ST. JEOR: SCORE: 1132.75

## 2021-04-07 ASSESSMENT — PAIN SCALES - GENERAL: PAINLEVEL: NO PAIN (0)

## 2021-04-07 NOTE — NURSING NOTE
"Patient here for yearly physical.  Lalitha Moore LPN ..........4/7/2021 1:20 PM   Chief Complaint   Patient presents with     Physical     yearly       Initial /68 (BP Location: Right arm, Patient Position: Sitting, Cuff Size: Adult Regular)   Pulse 68   Temp 98.4  F (36.9  C) (Tympanic)   Resp 18   Ht 1.651 m (5' 5\")   Wt 62.7 kg (138 lb 3.2 oz)   LMP  (LMP Unknown)   SpO2 99%   BMI 23.00 kg/m   Estimated body mass index is 23 kg/m  as calculated from the following:    Height as of this encounter: 1.651 m (5' 5\").    Weight as of this encounter: 62.7 kg (138 lb 3.2 oz).  Medication Reconciliation: complete    Lalitha Moore LPN    "

## 2021-04-07 NOTE — PROGRESS NOTES
SUBJECTIVE:   Edith Smith is a 71 year old female who presents to clinic today for the following health issues:    HPI  Patient has been very isolated due to COVId 19.  with COPD. Now s/p 2nd vaccine.     Contraception: n/a  Risk for STI?: no  Last pap: , no further planned  Any hx of abnormal paps:  no  FH of early CA?: no  Tobacco?: no  Calcium intake: yes  DEXA: , plan repeat in   Last mammo: 2020  Colonoscopy: , follow up in 10 years, consider cologuard.   Immunizations: up to date including COVID    Patient Active Problem List    Diagnosis Date Noted     SK (seborrheic keratosis) 2017     Priority: Medium     Hyperlipidemia 2017     Priority: Medium     Osteopenia 01/10/2014     Priority: Medium     Carpal tunnel syndrome 2013     Priority: Medium     Overview:   History of       Past Medical History:   Diagnosis Date     Encounter for full-term uncomplicated delivery     x 1     Lyme disease     2012      Past Surgical History:   Procedure Laterality Date     COLONOSCOPY  2011    Hyperplastic polyps only.  Next due      DILATION AND CURETTAGE      age 17     EXTRACAPSULAR CATARACT EXTRATION WITH INTRAOCULAR LENS IMPLANT      ,Dr. Beltran. rt     EXTRACAPSULAR CATARACT EXTRATION WITH INTRAOCULAR LENS IMPLANT      10/2013,Dr. Walden, left     LAPAROSCOPIC TUBAL LIGATION      age 38     Family History   Problem Relation Age of Onset     Cancer Mother         Cancer,lung CA,  age 65, borderline DM.     Other - See Comments Father          age 94, related to CVA, HTN     No Known Problems Brother      Other - See Comments Sister 71        osteopenia, uterine CA     Other - See Comments Sister         fibromyalgia,  age 77, multiple chronic health issues, unknown cause of death     Breast Cancer No family hx of         Cancer-breast     Social History     Tobacco Use     Smoking status: Former Smoker     Types: Cigarettes     Quit date:  "2000     Years since quittin.3     Smokeless tobacco: Never Used   Substance Use Topics     Alcohol use: Yes     Alcohol/week: 5.8 standard drinks     Comment: Alcoholic Drinks/day: slightly     Social History     Social History Narrative    . 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  Enjoys gardening, regular exercise, outdoor activity.   1 grown son, no regular contact.   [place]              Review of Systems 15 system ROS completed and negative other than: See HPI.      OBJECTIVE:     /68 (BP Location: Right arm, Patient Position: Sitting, Cuff Size: Adult Regular)   Pulse 68   Temp 98.4  F (36.9  C) (Tympanic)   Resp 18   Ht 1.651 m (5' 5\")   Wt 62.7 kg (138 lb 3.2 oz)   LMP  (LMP Unknown)   SpO2 99%   BMI 23.00 kg/m    Body mass index is 23 kg/m .  Physical Exam  Constitutional:       Appearance: She is well-developed.   Eyes:      Conjunctiva/sclera: Conjunctivae normal.   Neck:      Thyroid: No thyromegaly.   Cardiovascular:      Rate and Rhythm: Normal rate and regular rhythm.      Heart sounds: Normal heart sounds. No murmur.   Pulmonary:      Effort: Pulmonary effort is normal. No respiratory distress.      Breath sounds: Normal breath sounds.   Abdominal:      Palpations: Abdomen is soft.   Lymphadenopathy:      Cervical: No cervical adenopathy.   Skin:     Findings: No rash.   Neurological:      Mental Status: She is alert and oriented to person, place, and time.           PHQ-2 Score:     PHQ-2 (  [employer]) 2021   Q1: Little interest or pleasure in doing things 0 0   Q2: Feeling down, depressed or hopeless 0 0   PHQ-2 Score 0 0         ASSESSMENT/PLAN:         ICD-10-CM    1. IFG (impaired fasting glucose)  R73.01 Comprehensive Metabolic Panel     Hemoglobin A1c     Hemoglobin A1c     Comprehensive Metabolic Panel   2. Other hyperlipidemia  E78.49 Lipid " Panel     atorvastatin (LIPITOR) 20 MG tablet     Lipid Panel   3. Colon cancer screening  Z12.11 COLOGUARD([employer])   4. Osteopenia, unspecified location  M85.80 DX Hip/Pelvis/Spine   5. Asymptomatic menopausal state   Z78.0 DX Hip/Pelvis/Spine     Labs today.  Medications refilled.  Discussed options for colon cancer screening.  Patient is interested in Cologuard.  Referral sent.  Reviewed the last bone density scan, plan to repeat this year.  Patient has mammogram scheduled for later today.  Reviewed risk assessment and safe activities to pursue now that her COVID-19 vaccine series is completed.      Cyndie Andre MD  [facility]

## 2021-04-20 LAB — COLOGUARD-ABSTRACT: NEGATIVE

## 2021-05-11 ENCOUNTER — HOSPITAL ENCOUNTER (OUTPATIENT)
Dept: BONE DENSITY | Facility: OTHER | Age: 71
Discharge: HOME | End: 2021-05-11
Attending: FAMILY MEDICINE | Admitting: FAMILY MEDICINE
Payer: MEDICARE

## 2021-05-11 DIAGNOSIS — Z78.0 ASYMPTOMATIC MENOPAUSAL STATE: ICD-10-CM

## 2021-05-11 DIAGNOSIS — M85.80 OSTEOPENIA, UNSPECIFIED LOCATION: ICD-10-CM

## 2021-05-11 PROCEDURE — 77080 DXA BONE DENSITY AXIAL: CPT

## 2021-05-17 ENCOUNTER — OFFICE VISIT (OUTPATIENT)
Dept: FAMILY MEDICINE | Facility: OTHER | Age: 71
End: 2021-05-17
Attending: FAMILY MEDICINE
Payer: MEDICARE

## 2021-05-17 VITALS
HEART RATE: 73 BPM | BODY MASS INDEX: 23.36 KG/M2 | RESPIRATION RATE: 18 BRPM | SYSTOLIC BLOOD PRESSURE: 132 MMHG | DIASTOLIC BLOOD PRESSURE: 70 MMHG | WEIGHT: 140.4 LBS | TEMPERATURE: 98.4 F | OXYGEN SATURATION: 99 %

## 2021-05-17 DIAGNOSIS — M85.80 OSTEOPENIA, UNSPECIFIED LOCATION: Primary | ICD-10-CM

## 2021-05-17 LAB
CA-I SERPL ISE-MCNC: 4.8 MG/DL (ref 4.4–5.2)
DEPRECATED CALCIDIOL+CALCIFEROL SERPL-MC: 32.7 NG/ML
PTH-INTACT SERPL-MCNC: 39 PG/ML (ref 12–88)

## 2021-05-17 PROCEDURE — 99213 OFFICE O/P EST LOW 20 MIN: CPT | Performed by: FAMILY MEDICINE

## 2021-05-17 PROCEDURE — 82306 VITAMIN D 25 HYDROXY: CPT | Mod: ZL | Performed by: FAMILY MEDICINE

## 2021-05-17 PROCEDURE — 82330 ASSAY OF CALCIUM: CPT | Mod: ZL | Performed by: FAMILY MEDICINE

## 2021-05-17 PROCEDURE — 36415 COLL VENOUS BLD VENIPUNCTURE: CPT | Mod: ZL | Performed by: FAMILY MEDICINE

## 2021-05-17 PROCEDURE — G0463 HOSPITAL OUTPT CLINIC VISIT: HCPCS

## 2021-05-17 PROCEDURE — 83970 ASSAY OF PARATHORMONE: CPT | Mod: ZL | Performed by: FAMILY MEDICINE

## 2021-05-17 ASSESSMENT — PAIN SCALES - GENERAL: PAINLEVEL: NO PAIN (0)

## 2021-05-17 NOTE — NURSING NOTE
"Patient here to discuss test result of the Dexa  Lalitha Moore LPN ..........5/17/2021 9:35 AM   Chief Complaint   Patient presents with     Results     DEXA       Initial /70 (BP Location: Right arm, Patient Position: Sitting, Cuff Size: Adult Regular)   Pulse 73   Temp 98.4  F (36.9  C) (Tympanic)   Resp 18   Wt 63.7 kg (140 lb 6.4 oz)   LMP  (LMP Unknown)   SpO2 99%   BMI 23.36 kg/m   Estimated body mass index is 23.36 kg/m  as calculated from the following:    Height as of 4/7/21: 1.651 m (5' 5\").    Weight as of this encounter: 63.7 kg (140 lb 6.4 oz).  Medication Reconciliation: complete    Lalitha Moore LPN    "

## 2021-05-17 NOTE — PROGRESS NOTES
SUBJECTIVE:   Edith Smith is a 71 year old female who presents to clinic today for the following health issues:    Patient follows up today to discuss her recent bone density scan results.  These are reviewed with her in detail.  Her lowest T score is -1.4 of the left femur, right femur is similar.  She actually has normal bone density of her lumbar spine.  Her FRAX score is quite elevated due to her family history of hip fracture, her dad had a hip fracture in his 80s.  There is been very little worsening of bone density when compared to previous imaging done 2 years ago.    Edith is very active.  She is physically active, walking regularly.  She is very active in her garden.      OBJECTIVE:     /70 (BP Location: Right arm, Patient Position: Sitting, Cuff Size: Adult Regular)   Pulse 73   Temp 98.4  F (36.9  C) (Tympanic)   Resp 18   Wt 63.7 kg (140 lb 6.4 oz)   LMP  (LMP Unknown)   SpO2 99%   BMI 23.36 kg/m    Body mass index is 23.36 kg/m .  Physical Exam  Constitutional:       Appearance: She is well-developed.   HENT:      Right Ear: External ear normal.      Left Ear: External ear normal.   Eyes:      General: No scleral icterus.     Conjunctiva/sclera: Conjunctivae normal.   Cardiovascular:      Rate and Rhythm: Normal rate.   Pulmonary:      Effort: Pulmonary effort is normal. No respiratory distress.   Skin:     Findings: No rash.   Neurological:      Mental Status: She is alert.         DEXA  Interpretation:  Osteopenia     Lowest Category Site:  Left femur     Lowest Category T-Score:  -1.4     Lowest Category % Change from most recent:  -0.6   %     FRAX Score at hip is:  11.7   %     FRAX Score for major osteoporotic fracture is:  23.1   %    Results for orders placed or performed in visit on 05/17/21   PTH, Intact     Status: None   Result Value Ref Range    Parathyroid Hormone Intact 39 12 - 88 pg/mL   Vitamin D Total     Status: None   Result Value Ref Range    Vitamin D Total  32.7 ng/mL         ASSESSMENT/PLAN:         ICD-10-CM    1. Osteopenia, unspecified location  M85.80 Vitamin D Total     PTH, Intact     Calcium, Ionized     Calcium, Ionized     PTH, Intact     Vitamin D Total     Reviewed DEXA scan with the patient at length.  Overall reassured with normal bone density of the lumbar spine.  Stable osteopenia noted of the bilateral hips.  FRAX score is elevated due to family history of hip fracture.  Patient continues to be very active, with weightbearing exercises daily.  Labs are all reassuring.    Based on our conversation today, the patient opted to NOT proceed with osteoporosis prevention therapy.  We'll continue to monitor bone density.  Patient will continue to be active with weightbearing exercises.    Cyndie Andre MD  [facility] AND [facility]

## 2021-05-24 ENCOUNTER — TELEPHONE (OUTPATIENT)
Dept: FAMILY MEDICINE | Facility: OTHER | Age: 71
End: 2021-05-24

## 2021-05-24 NOTE — TELEPHONE ENCOUNTER
I think she should bump it up to [id number] daily for a few months and then she could probably decrease back to 2500mg. In general, it may be worth bumping up to 5000mg during the winter months. (or take 4000 + the 500 in her MVI, this would be close enough) Cyndie Andre MD

## 2021-05-24 NOTE — TELEPHONE ENCOUNTER
My chart message recommended to take 2000 international unit(s) of vitamin D or if she is currently taking vitamin d she should double that amount.  She states that she currently taking 500 international unit(s) in with her multiple vitamins as well as a 2000 international unit(s) capsule.  She wants to ensure that it is ok to take that combined amount.   Deb Hummel LPN........................5/24/2021  10:02 AM

## 2021-10-09 ENCOUNTER — HEALTH MAINTENANCE LETTER (OUTPATIENT)
Age: 71
End: 2021-10-09

## 2021-10-26 ENCOUNTER — ALLIED HEALTH/NURSE VISIT (OUTPATIENT)
Dept: FAMILY MEDICINE | Facility: OTHER | Age: 71
End: 2021-10-26
Attending: FAMILY MEDICINE
Payer: MEDICARE

## 2021-10-26 DIAGNOSIS — Z23 NEED FOR PROPHYLACTIC VACCINATION AND INOCULATION AGAINST INFLUENZA: Primary | ICD-10-CM

## 2021-10-26 PROCEDURE — 90662 IIV NO PRSV INCREASED AG IM: CPT

## 2021-10-26 PROCEDURE — G0008 ADMIN INFLUENZA VIRUS VAC: HCPCS

## 2021-10-26 NOTE — PROGRESS NOTES
Fluzone 65+ administered per patient/parent request.    Deb Zapata RN  ....................  10/26/2021   9:05 AM

## 2022-04-13 ENCOUNTER — OFFICE VISIT (OUTPATIENT)
Dept: FAMILY MEDICINE | Facility: OTHER | Age: 72
End: 2022-04-13
Attending: FAMILY MEDICINE
Payer: MEDICARE

## 2022-04-13 ENCOUNTER — HOSPITAL ENCOUNTER (OUTPATIENT)
Dept: MAMMOGRAPHY | Facility: OTHER | Age: 72
Discharge: HOME | End: 2022-04-13
Attending: FAMILY MEDICINE
Payer: MEDICARE

## 2022-04-13 VITALS
RESPIRATION RATE: 16 BRPM | SYSTOLIC BLOOD PRESSURE: 136 MMHG | TEMPERATURE: 97.8 F | DIASTOLIC BLOOD PRESSURE: 76 MMHG | WEIGHT: 146.6 LBS | OXYGEN SATURATION: 97 % | HEART RATE: 87 BPM | BODY MASS INDEX: 24.43 KG/M2 | HEIGHT: 65 IN

## 2022-04-13 DIAGNOSIS — R73.01 IFG (IMPAIRED FASTING GLUCOSE): Primary | ICD-10-CM

## 2022-04-13 DIAGNOSIS — E55.9 VITAMIN D DEFICIENCY: ICD-10-CM

## 2022-04-13 DIAGNOSIS — Z12.31 VISIT FOR SCREENING MAMMOGRAM: ICD-10-CM

## 2022-04-13 DIAGNOSIS — E78.49 OTHER HYPERLIPIDEMIA: ICD-10-CM

## 2022-04-13 DIAGNOSIS — M85.80 OSTEOPENIA, UNSPECIFIED LOCATION: ICD-10-CM

## 2022-04-13 LAB
ALBUMIN SERPL-MCNC: 4.6 G/DL (ref 3.5–5.7)
ALP SERPL-CCNC: 43 U/L (ref 34–104)
ALT SERPL W P-5'-P-CCNC: 22 U/L (ref 7–52)
ANION GAP SERPL CALCULATED.3IONS-SCNC: 7 MMOL/L (ref 3–14)
AST SERPL W P-5'-P-CCNC: 25 U/L (ref 13–39)
BILIRUB SERPL-MCNC: 0.8 MG/DL (ref 0.3–1)
BUN SERPL-MCNC: 17 MG/DL (ref 7–25)
CALCIUM SERPL-MCNC: 9.7 MG/DL (ref 8.6–10.3)
CHLORIDE BLD-SCNC: 104 MMOL/L (ref 98–107)
CHOLEST SERPL-MCNC: 190 MG/DL
CO2 SERPL-SCNC: 29 MMOL/L (ref 21–31)
CREAT SERPL-MCNC: 0.74 MG/DL (ref 0.6–1.2)
DEPRECATED CALCIDIOL+CALCIFEROL SERPL-MC: 49 UG/L (ref 30–100)
FASTING STATUS PATIENT QL REPORTED: ABNORMAL
GFR SERPL CREATININE-BSD FRML MDRD: 84 ML/MIN/1.73M2
GLUCOSE BLD-MCNC: 113 MG/DL (ref 70–105)
HBA1C MFR BLD: 5.4 % (ref 4–6.2)
HDLC SERPL-MCNC: 60 MG/DL (ref 23–92)
LDLC SERPL CALC-MCNC: 112 MG/DL
NONHDLC SERPL-MCNC: 130 MG/DL
POTASSIUM BLD-SCNC: 4.7 MMOL/L (ref 3.5–5.1)
PROT SERPL-MCNC: 7.3 G/DL (ref 6.4–8.9)
SODIUM SERPL-SCNC: 140 MMOL/L (ref 134–144)
TRIGL SERPL-MCNC: 90 MG/DL

## 2022-04-13 PROCEDURE — 80053 COMPREHEN METABOLIC PANEL: CPT | Mod: ZL | Performed by: FAMILY MEDICINE

## 2022-04-13 PROCEDURE — 80061 LIPID PANEL: CPT | Mod: ZL | Performed by: FAMILY MEDICINE

## 2022-04-13 PROCEDURE — 77067 SCR MAMMO BI INCL CAD: CPT

## 2022-04-13 PROCEDURE — 82306 VITAMIN D 25 HYDROXY: CPT | Mod: ZL | Performed by: FAMILY MEDICINE

## 2022-04-13 PROCEDURE — 36415 COLL VENOUS BLD VENIPUNCTURE: CPT | Mod: ZL | Performed by: FAMILY MEDICINE

## 2022-04-13 PROCEDURE — 83036 HEMOGLOBIN GLYCOSYLATED A1C: CPT | Mod: ZL | Performed by: FAMILY MEDICINE

## 2022-04-13 PROCEDURE — G0439 PPPS, SUBSEQ VISIT: HCPCS | Performed by: FAMILY MEDICINE

## 2022-04-13 RX ORDER — VITAMIN B COMPLEX
25 TABLET ORAL DAILY
COMMUNITY

## 2022-04-13 RX ORDER — ATORVASTATIN CALCIUM 20 MG/1
20 TABLET, FILM COATED ORAL DAILY
Qty: 90 TABLET | Refills: 3 | Status: SHIPPED | OUTPATIENT
Start: 2022-04-13 | End: 2023-04-28

## 2022-04-13 ASSESSMENT — ACTIVITIES OF DAILY LIVING (ADL): CURRENT_FUNCTION: NO ASSISTANCE NEEDED

## 2022-04-13 ASSESSMENT — PAIN SCALES - GENERAL: PAINLEVEL: NO PAIN (0)

## 2022-04-13 NOTE — NURSING NOTE
"Chief Complaint   Patient presents with     Medicare Visit     Annual wellness visit       Initial /76 (BP Location: Right arm, Patient Position: Sitting, Cuff Size: Adult Regular)   Pulse 87   Temp 97.8  F (36.6  C) (Tympanic)   Resp 16   Ht 1.645 m (5' 4.75\")   Wt 66.5 kg (146 lb 9.6 oz)   LMP  (LMP Unknown)   SpO2 97%   BMI 24.58 kg/m   Estimated body mass index is 24.58 kg/m  as calculated from the following:    Height as of this encounter: 1.645 m (5' 4.75\").    Weight as of this encounter: 66.5 kg (146 lb 9.6 oz).  Medication Reconciliation: complete    Lalitha Moore LPN    Advance Care Directive reviewed    "

## 2022-04-13 NOTE — PROGRESS NOTES
"  Assessment & Plan       ICD-10-CM    1. IFG (impaired fasting glucose)  R73.01 Hemoglobin A1c     Hemoglobin A1c   2. Other hyperlipidemia  E78.49 atorvastatin (LIPITOR) 20 MG tablet     Lipid Panel     Comprehensive Metabolic Panel     Comprehensive Metabolic Panel     Lipid Panel   3. Osteopenia, unspecified location  M85.80    4. Vitamin D deficiency  E55.9 Vitamin D Total     Vitamin D Total       Labs today.   Medications refilled.   Encouraged to complete HCD, resources discussed.   Increase activity.       No follow-ups on file.    Cyndie Andre MD  [facility]   Edith is a 72 year old who presents for the following health issues     Healthy Habits:     In general, how would you rate your overall health?  Good    Frequency of exercise:  2-3 days/week    Duration of exercise:  15-30 minutes    Do you usually eat at least 4 servings of fruit and vegetables a day, include whole grains    & fiber and avoid regularly eating high fat or \"junk\" foods?  Yes    Taking medications regularly:  Yes    Medication side effects:  None    Ability to successfully perform activities of daily living:  No assistance needed    Home Safety:  No safety concerns identified    Hearing Impairment:  Difficulty understanding soft or whispered speech    In the past 6 months, have you been bothered by leaking of urine?  No    In general, how would you rate your overall mental or emotional health?  Good      PHQ-2 Total Score: 0    Additional concerns today:  No       Annual Wellness Visit    Patient has been advised of split billing requirements and indicates understanding: Yes     Are you in the first 12 months of your Medicare Part B coverage?  No      Do you feel safe in your environment? Yes    Have you ever done Advance Care Planning? (For example, a Health Directive, POLST, or a discussion with a medical provider or your loved ones about your wishes)? Patient has forms.     Fall risk:  Fallen 2 " "or more times in the past year?: (P) No  Any fall with injury in the past year?: (P) No    Cognitive Screenin) Repeat 3 items (Leader, Season, Table)    2) Clock draw: NORMAL  3) 3 item recall: Recalls 3 objects  Results: 3 items recalled: COGNITIVE IMPAIRMENT LESS LIKELY    Mini-CogTM Copyright GHADA Bellamy. Licensed by the author for use in [facility]; reprinted with permission ([email or web address]@[email or web address]). All rights reserved.      Do you have sleep apnea, excessive snoring or daytime drowsiness?: not sleeping as well as she should    Current providers sharing in care for this patient include:   Patient Care Team:  Cyndie Andre MD as PCP - General (Family Practice)  Cyndie Andre MD as Assigned PCP    Patient has been advised of split billing requirements and indicates understanding: Yes      Review of Systems   Constitutional, HEENT, cardiovascular, pulmonary, gi and gu systems are negative, except as otherwise noted.      Objective    /76 (BP Location: Right arm, Patient Position: Sitting, Cuff Size: Adult Regular)   Pulse 87   Temp 97.8  F (36.6  C) (Tympanic)   Resp 16   Ht 1.645 m (5' 4.75\")   Wt 66.5 kg (146 lb 9.6 oz)   LMP  (LMP Unknown)   SpO2 97%   BMI 24.58 kg/m    Body mass index is 24.58 kg/m .  Physical Exam  Constitutional:       Appearance: She is well-developed.   Eyes:      Conjunctiva/sclera: Conjunctivae normal.   Neck:      Thyroid: No thyromegaly.   Cardiovascular:      Rate and Rhythm: Normal rate and regular rhythm.      Heart sounds: Normal heart sounds. No murmur heard.  Pulmonary:      Effort: Pulmonary effort is normal. No respiratory distress.      Breath sounds: Normal breath sounds.   Abdominal:      Palpations: Abdomen is soft.   Lymphadenopathy:      Cervical: No cervical adenopathy.   Skin:     Findings: No rash.   Neurological:      Mental Status: She is alert and oriented to person, place, and time.                     "

## 2023-04-28 ENCOUNTER — OFFICE VISIT (OUTPATIENT)
Dept: FAMILY MEDICINE | Facility: OTHER | Age: 73
End: 2023-04-28
Attending: FAMILY MEDICINE
Payer: MEDICARE

## 2023-04-28 ENCOUNTER — HOSPITAL ENCOUNTER (OUTPATIENT)
Dept: MAMMOGRAPHY | Facility: OTHER | Age: 73
Discharge: HOME | End: 2023-04-28
Attending: FAMILY MEDICINE
Payer: MEDICARE

## 2023-04-28 VITALS
WEIGHT: 147.4 LBS | HEART RATE: 82 BPM | HEIGHT: 64 IN | BODY MASS INDEX: 25.16 KG/M2 | SYSTOLIC BLOOD PRESSURE: 122 MMHG | DIASTOLIC BLOOD PRESSURE: 86 MMHG | TEMPERATURE: 97.9 F | OXYGEN SATURATION: 97 % | RESPIRATION RATE: 16 BRPM

## 2023-04-28 DIAGNOSIS — R73.01 IFG (IMPAIRED FASTING GLUCOSE): Primary | ICD-10-CM

## 2023-04-28 DIAGNOSIS — E78.5 HYPERLIPIDEMIA, UNSPECIFIED HYPERLIPIDEMIA TYPE: Primary | ICD-10-CM

## 2023-04-28 DIAGNOSIS — I49.9 IRREGULAR HEARTBEAT: ICD-10-CM

## 2023-04-28 DIAGNOSIS — M85.80 OSTEOPENIA, UNSPECIFIED LOCATION: ICD-10-CM

## 2023-04-28 DIAGNOSIS — H91.90 HEARING LOSS, UNSPECIFIED HEARING LOSS TYPE, UNSPECIFIED LATERALITY: ICD-10-CM

## 2023-04-28 DIAGNOSIS — R73.01 IFG (IMPAIRED FASTING GLUCOSE): ICD-10-CM

## 2023-04-28 DIAGNOSIS — E28.39 ESTROGEN DEFICIENCY: ICD-10-CM

## 2023-04-28 DIAGNOSIS — E78.49 OTHER HYPERLIPIDEMIA: ICD-10-CM

## 2023-04-28 DIAGNOSIS — Z12.31 VISIT FOR SCREENING MAMMOGRAM: ICD-10-CM

## 2023-04-28 DIAGNOSIS — Z23 NEED FOR TETANUS BOOSTER: ICD-10-CM

## 2023-04-28 LAB
ALBUMIN SERPL BCG-MCNC: 4.4 G/DL (ref 3.5–5.2)
ALP SERPL-CCNC: 62 U/L (ref 35–104)
ALT SERPL W P-5'-P-CCNC: 27 U/L (ref 10–35)
ANION GAP SERPL CALCULATED.3IONS-SCNC: 10 MMOL/L (ref 7–15)
AST SERPL W P-5'-P-CCNC: 32 U/L (ref 10–35)
BILIRUB SERPL-MCNC: 0.7 MG/DL
BUN SERPL-MCNC: 16.2 MG/DL (ref 8–23)
CALCIUM SERPL-MCNC: 9.2 MG/DL (ref 8.8–10.2)
CHLORIDE SERPL-SCNC: 103 MMOL/L (ref 98–107)
CHOLEST SERPL-MCNC: 157 MG/DL
CREAT SERPL-MCNC: 0.66 MG/DL (ref 0.51–0.95)
DEPRECATED HCO3 PLAS-SCNC: 27 MMOL/L (ref 22–29)
GFR SERPL CREATININE-BSD FRML MDRD: >90 ML/MIN/1.73M2
GLUCOSE SERPL-MCNC: 121 MG/DL (ref 70–99)
HBA1C MFR BLD: 5.4 % (ref 4–6.2)
HDLC SERPL-MCNC: 66 MG/DL
HOLD SPECIMEN: NORMAL
LDLC SERPL CALC-MCNC: 81 MG/DL
NONHDLC SERPL-MCNC: 91 MG/DL
POTASSIUM SERPL-SCNC: 4.2 MMOL/L (ref 3.4–5.3)
PROT SERPL-MCNC: 7 G/DL (ref 6.4–8.3)
SODIUM SERPL-SCNC: 140 MMOL/L (ref 136–145)
TRIGL SERPL-MCNC: 48 MG/DL

## 2023-04-28 PROCEDURE — G0439 PPPS, SUBSEQ VISIT: HCPCS | Performed by: FAMILY MEDICINE

## 2023-04-28 PROCEDURE — 80053 COMPREHEN METABOLIC PANEL: CPT | Mod: ZL | Performed by: FAMILY MEDICINE

## 2023-04-28 PROCEDURE — 36415 COLL VENOUS BLD VENIPUNCTURE: CPT | Mod: ZL | Performed by: FAMILY MEDICINE

## 2023-04-28 PROCEDURE — 93005 ELECTROCARDIOGRAM TRACING: CPT | Performed by: FAMILY MEDICINE

## 2023-04-28 PROCEDURE — 93010 ELECTROCARDIOGRAM REPORT: CPT | Performed by: INTERNAL MEDICINE

## 2023-04-28 PROCEDURE — 80061 LIPID PANEL: CPT | Mod: ZL | Performed by: FAMILY MEDICINE

## 2023-04-28 PROCEDURE — 83036 HEMOGLOBIN GLYCOSYLATED A1C: CPT | Mod: ZL | Performed by: FAMILY MEDICINE

## 2023-04-28 PROCEDURE — 77067 SCR MAMMO BI INCL CAD: CPT

## 2023-04-28 RX ORDER — ATORVASTATIN CALCIUM 20 MG/1
20 TABLET, FILM COATED ORAL DAILY
Qty: 90 TABLET | Refills: 3 | Status: SHIPPED | OUTPATIENT
Start: 2023-04-28 | End: 2024-01-30

## 2023-04-28 ASSESSMENT — ENCOUNTER SYMPTOMS
HEMATURIA: 0
PALPITATIONS: 0
BREAST MASS: 0
CONSTIPATION: 0
HEARTBURN: 0
DIZZINESS: 0
PARESTHESIAS: 0
CHILLS: 0
HEMATOCHEZIA: 0
ARTHRALGIAS: 0
NAUSEA: 0
HEADACHES: 0
MYALGIAS: 0
FEVER: 0
NERVOUS/ANXIOUS: 0
DIARRHEA: 0
WEAKNESS: 0
FREQUENCY: 1
SORE THROAT: 0
COUGH: 0
ABDOMINAL PAIN: 0
EYE PAIN: 0
DYSURIA: 0
SHORTNESS OF BREATH: 0
JOINT SWELLING: 0

## 2023-04-28 ASSESSMENT — PAIN SCALES - GENERAL: PAINLEVEL: NO PAIN (0)

## 2023-04-28 ASSESSMENT — ACTIVITIES OF DAILY LIVING (ADL): CURRENT_FUNCTION: NO ASSISTANCE NEEDED

## 2023-04-28 NOTE — PROGRESS NOTES
"SUBJECTIVE:   Edith is a 73 year old who presents for Preventive Visit.      4/28/2023     8:47 AM   Additional Questions   Roomed by Isabella Eli LPN   Accompanied by N/A         4/28/2023     8:47 AM   Patient Reported Additional Medications   Patient reports taking the following new medications N/A   Patient has been advised of split billing requirements and indicates understanding: Yes  Are you in the first 12 months of your Medicare coverage?  No    Healthy Habits:     In general, how would you rate your overall health?  Good    Frequency of exercise:  6-7 days/week    Duration of exercise:  15-30 minutes    Do you usually eat at least 4 servings of fruit and vegetables a day, include whole grains    & fiber and avoid regularly eating high fat or \"junk\" foods?  Yes    Taking medications regularly:  Yes    Medication side effects:  None    Ability to successfully perform activities of daily living:  No assistance needed    Home Safety:  Throw rugs in the hallway, lack of grab bars in the bathroom and lack of handrails on stairs    Hearing Impairment:  Need to ask people to speak up or repeat themselves and difficulty understanding soft or whispered speech    In the past 6 months, have you been bothered by leaking of urine?  No    In general, how would you rate your overall mental or emotional health?  Good      PHQ-2 Total Score: 0    Additional concerns today:  Yes      Have you ever done Advance Care Planning? (For example, a Health Directive, POLST, or a discussion with a medical provider or your loved ones about your wishes): Yes, patient states has an Advance Care Planning document and will bring a copy to the clinic.       Fall risk  Fallen 2 or more times in the past year?: No  Any fall with injury in the past year?: No    Cognitive Screening   1) Repeat 3 items (Leader, Season, Table)    2) Clock draw: NORMAL  3) 3 item recall: Recalls 3 objects  Results: 3 items recalled: COGNITIVE IMPAIRMENT LESS " LIKELY    Mini-CogTM Copyright GHADA Bellamy. Licensed by the author for use in [facility]; reprinted with permission ([email or web address]@[email or web address]). All rights reserved.      Do you have sleep apnea, excessive snoring or daytime drowsiness?: no    Reviewed and updated as needed this visit by clinical staff   Tobacco  Allergies  Meds      Soc Hx        Reviewed and updated as needed this visit by Provider                 Social History     Tobacco Use     Smoking status: Former     Types: Cigarettes     Quit date: 2000     Years since quittin.4     Smokeless tobacco: Never   Vaping Use     Vaping status: Never Used   Substance Use Topics     Alcohol use: Yes     Alcohol/week: 5.8 standard drinks of alcohol     Comment: Alcoholic Drinks/day: slightly             2023     8:50 AM   Alcohol Use   Prescreen: >3 drinks/day or >7 drinks/week? No         2022     8:44 AM   AUDIT - Alcohol Use Disorders Identification Test - Reproduced from the World Health Organization Audit 2001 (Second Edition)   1.  How often do you have a drink containing alcohol? 4 or more times a week   2.  How many drinks containing alcohol do you have on a typical day when you are drinking? 1 or 2   3.  How often do you have five or more drinks on one occasion? Never   4.  How often during the last year have you found that you were not able to stop drinking once you had started? Never   5.  How often during the last year have you failed to do what was normally expected of you because of drinking? Never   6.  How often during the last year have you needed a first drink in the morning to get yourself going after a heavy drinking session? Never   7.  How often during the last year have you had a feeling of guilt or remorse after drinking? Never   8.  How often during the last year have you been unable to remember what happened the night before because of your drinking? Never   9.  Have you or someone else been injured because of your  drinking? No   10. Has a relative, friend, doctor or other health care worker been concerned about your drinking or suggested you cut down? No   TOTAL SCORE 4     Do you have a current opioid prescription? No  Do you use any other controlled substances or medications that are not prescribed by a provider? Cannabis    Current providers sharing in care for this patient include:   Patient Care Team:  Cyndie Andre MD as PCP - General (Family Practice)  Cyndie Andre MD as Assigned PCP    The following health maintenance items are reviewed in Epic and correct as of today:  Health Maintenance   Topic Date Due     DTAP/TDAP/TD IMMUNIZATION (2 - Td or Tdap) 11/30/2022     MEDICARE ANNUAL WELLNESS VISIT  04/13/2023     MAMMO SCREENING  04/13/2024     COLORECTAL CANCER SCREENING  04/20/2024     FALL RISK ASSESSMENT  04/28/2024     LIPID  04/13/2027     ADVANCE CARE PLANNING  04/28/2028     DEXA  05/11/2036     HEPATITIS C SCREENING  Completed     PHQ-2 (once per calendar year)  Completed     INFLUENZA VACCINE  Completed     Pneumococcal Vaccine: 65+ Years  Completed     ZOSTER IMMUNIZATION  Completed     COVID-19 Vaccine  Completed     IPV IMMUNIZATION  Aged Out     MENINGITIS IMMUNIZATION  Aged Out         Pertinent mammograms are reviewed under the imaging tab.    Review of Systems   Constitutional: Negative for chills and fever.   HENT: Positive for hearing loss. Negative for congestion, ear pain and sore throat.    Eyes: Negative for pain and visual disturbance.   Respiratory: Negative for cough and shortness of breath.    Cardiovascular: Negative for chest pain, palpitations and peripheral edema.   Gastrointestinal: Negative for abdominal pain, constipation, diarrhea, heartburn, hematochezia and nausea.   Breasts:  Negative for tenderness, breast mass and discharge.   Genitourinary: Positive for frequency. Negative for dysuria, genital sores, hematuria, pelvic pain, urgency, vaginal bleeding and vaginal discharge.  "  Musculoskeletal: Negative for arthralgias, joint swelling and myalgias.   Skin: Negative for rash.   Neurological: Negative for dizziness, weakness, headaches and paresthesias.   Psychiatric/Behavioral: Negative for mood changes. The patient is not nervous/anxious.        OBJECTIVE:   /86 (BP Location: Right arm, Patient Position: Sitting, Cuff Size: Adult Regular)   Pulse 82   Temp 97.9  F (36.6  C) (Tympanic)   Resp 16   Ht 1.619 m (5' 3.75\")   Wt 66.9 kg (147 lb 6.4 oz)   LMP  (LMP Unknown)   SpO2 97%   BMI 25.50 kg/m   Estimated body mass index is 25.5 kg/m  as calculated from the following:    Height as of this encounter: 1.619 m (5' 3.75\").    Weight as of this encounter: 66.9 kg (147 lb 6.4 oz).  Physical Exam  Constitutional:       Appearance: She is well-developed.   Eyes:      Conjunctiva/sclera: Conjunctivae normal.   Neck:      Thyroid: No thyromegaly.   Cardiovascular:      Rate and Rhythm: Normal rate. Rhythm irregular.      Heart sounds: Normal heart sounds. No murmur heard.  Pulmonary:      Effort: Pulmonary effort is normal. No respiratory distress.      Breath sounds: Normal breath sounds.   Abdominal:      Palpations: Abdomen is soft.   Lymphadenopathy:      Cervical: No cervical adenopathy.   Skin:     Findings: No rash.   Neurological:      Mental Status: She is alert and oriented to person, place, and time.       EKG: occasional PACs.     Diagnostic Test Results:  Labs reviewed in Epic    ASSESSMENT / PLAN:       ICD-10-CM    1. Hyperlipidemia, unspecified hyperlipidemia type  E78.5 Lipid Panel     Comprehensive Metabolic Panel      2. Osteopenia, unspecified location  M85.80 CANCELED: DX Hip/Pelvis/Spine      3. Hearing loss, unspecified hearing loss type, unspecified laterality  H91.90 Adult Audiology  Referral      4. Estrogen deficiency  E28.39 CANCELED: DX Hip/Pelvis/Spine      5. Need for tetanus booster  Z23 Tdap, tetanus-diptheria-acell pertussis, (BOOSTRIX) " "5-2.5-18.5 LF-MCG/0.5 SUSP injection      6. Irregular heartbeat  I49.9 EKG 12-lead complete w/read - Clinics      7. Other hyperlipidemia  E78.49 atorvastatin (LIPITOR) 20 MG tablet        Referred for formal hearing eval, hearing aids likely indicated.  Fasting labs today.  Reviewed previous bone density scan.  Opted not to repeat this year.  Consider in the future.  Medications reviewed and refilled.          COUNSELING:  Reviewed preventive health counseling, as reflected in patient instructions      BMI:   Estimated body mass index is 25.5 kg/m  as calculated from the following:    Height as of this encounter: 1.619 m (5' 3.75\").    Weight as of this encounter: 66.9 kg (147 lb 6.4 oz).         She reports that she quit smoking about 22 years ago. Her smoking use included cigarettes. She has never used smokeless tobacco.      Appropriate preventive services were discussed with this patient, including applicable screening as appropriate for cardiovascular disease, diabetes, osteopenia/osteoporosis, and glaucoma.  As appropriate for age/gender, discussed screening for colorectal cancer, prostate cancer, breast cancer, and cervical cancer. Checklist reviewing preventive services available has been given to the patient.    Reviewed patients plan of care and provided an AVS. The Basic Care Plan (routine screening as documented in Health Maintenance) for Edith meets the Care Plan requirement. This Care Plan has been established and reviewed with the Patient.    Cyndie Andre MD  [facility] [place]    Identified Health Risks:    I have reviewed Opioid Use Disorder and Substance Use Disorder risk factors and made any needed referrals.     "

## 2023-04-28 NOTE — NURSING NOTE
"Chief Complaint   Patient presents with     Wellness Visit     Medicare         Initial /86 (BP Location: Right arm, Patient Position: Sitting, Cuff Size: Adult Regular)   Pulse 82   Temp 97.9  F (36.6  C) (Tympanic)   Resp 16   Ht 1.619 m (5' 3.75\")   Wt 66.9 kg (147 lb 6.4 oz)   LMP  (LMP Unknown)   SpO2 97%   BMI 25.50 kg/m   Estimated body mass index is 25.5 kg/m  as calculated from the following:    Height as of this encounter: 1.619 m (5' 3.75\").    Weight as of this encounter: 66.9 kg (147 lb 6.4 oz).       Medication Reconciliation: Complete    Isabella Eli LPN .......  4/28/2023  8:49 AM   "

## 2023-05-01 LAB
ATRIAL RATE - MUSE: 61 BPM
DIASTOLIC BLOOD PRESSURE - MUSE: NORMAL MMHG
INTERPRETATION ECG - MUSE: NORMAL
P AXIS - MUSE: 4 DEGREES
PR INTERVAL - MUSE: 154 MS
QRS DURATION - MUSE: 90 MS
QT - MUSE: 424 MS
QTC - MUSE: 426 MS
R AXIS - MUSE: 26 DEGREES
SYSTOLIC BLOOD PRESSURE - MUSE: NORMAL MMHG
T AXIS - MUSE: 26 DEGREES
VENTRICULAR RATE- MUSE: 61 BPM

## 2023-08-29 ENCOUNTER — OFFICE VISIT (OUTPATIENT)
Dept: OTOLARYNGOLOGY | Facility: OTHER | Age: 73
End: 2023-08-29
Attending: OTOLARYNGOLOGY
Payer: MEDICARE

## 2023-08-29 DIAGNOSIS — H90.3 SENSORINEURAL HEARING LOSS (SNHL) OF BOTH EARS: Primary | ICD-10-CM

## 2023-08-29 PROCEDURE — G0463 HOSPITAL OUTPT CLINIC VISIT: HCPCS

## 2023-09-06 NOTE — PROGRESS NOTES
document embedded image  Patient Name: Edith Smith    Address: [address] [place] [address]     YOB: 1950    [place] [id number]    MR Number: [id number]    Phone: [phone]  PCP: Cyndie Andre MD            Appointment Date: 08/29/23   Visit Provider: Andi Villatoro MD    cc: Cyndie Andre MD; ~    ENT Progress Note  Intake  Visit Reasons: Hearing test    HPI  History of Present Illness  Chief complaint:  Hearing loss    History  The patient is a 73-year-old female who seems to notice some developing hearing loss.  She feels it is low-grade.  She has a history of a fair amount of machinery noise.  She worked as a  for many years.  Hearing history is otherwise unremarkable.    Exam  The external auditory canals and TMs are clear bilaterally  The remainder of the head neck exam is unremarkable  Audiogram reveals a sloping sensorineural hearing loss with speech reception thresholds of 35 decibels on the right and 40 decibels on the left.  She is an 88% discrimination score on the right compared to 96% on the left.  Her tympanograms are normal.        Allergies    No Known Allergies Allergy (Verified 08/30/23 10:44)    PFSH  PFSH:     Family History: (Updated 08/30/23 @ 10:44 by Cha Narayan, Med Assist)  Other  Cancer  Stroke       Social History: (Updated 08/30/23 @ 10:45 by Cha Narayan, Med Assist)  Smoking Status:  Unknown if ever smoked  alcohol intake:  current       A&P  Assessment & Plan  (1) Sensorineural hearing loss, bilateral:        Status: Acute        Code(s):  H90.3 - Sensorineural hearing loss, bilateral  The patient was counseled that she would be well served by hearing aid trial.  She is welcome to follow up if we can be of assistance with a fitting in the future.               Andi Villatoro MD    Filed: 08/30/23 [id number]    <Electronically signed by Andi Villatoro MD> 08/30/23 [id number]

## 2024-01-28 DIAGNOSIS — E78.49 OTHER HYPERLIPIDEMIA: ICD-10-CM

## 2024-01-30 RX ORDER — ATORVASTATIN CALCIUM 20 MG/1
20 TABLET, FILM COATED ORAL DAILY
Qty: 90 TABLET | Refills: 0 | Status: SHIPPED | OUTPATIENT
Start: 2024-01-30 | End: 2024-05-02

## 2024-01-30 NOTE — TELEPHONE ENCOUNTER
Logan Hernandez sent Rx request for the following:      Requested Prescriptions   Pending Prescriptions Disp Refills    atorvastatin (LIPITOR) 20 MG tablet [Pharmacy Med Name: ATORVASTATIN 20MG TABLET] 90 tablet 1     Sig: TAKE 1 TABLET BY MOUTH ONCE DAILY       Antihyperlipidemic agents Passed - 1/28/2024 10:33 AM        Passed - Lipid panel on file in past 12 mos     Recent Labs   Lab Test 04/28/23  0936   CHOL 157   TRIG 48   HDL 66   LDL 81   NHDL 91               Passed - Normal serum ALT on record in past 12 mos     Recent Labs   Lab Test 04/28/23  0936   ALT 27             Passed - Recent (12 mo) or future (30 days) visit within the authorizing provider's specialty     The patient must have completed an in-person or virtual visit within the past 12 months or has a future visit scheduled within the next 90 days with the authorizing provider s specialty.  Urgent care and e-visits do not quality as an office visit for this protocol.          Passed - Medication is active on med list        Passed - Patient is age 18 years or older        Passed - No active pregnancy on record        Passed - No positive pregnancy test in past 12 mos         Last Prescription Date:   04/08/23  Last Fill Qty/Refills:         90, R-3      Last Office Visit:              04/28/23   Future Office visit:           none.     Prescription approved per [facility] Refill Protocol.    Raina Harrison RN on 1/30/2024 at 3:52 PM

## 2024-02-24 NOTE — PROGRESS NOTES
"Patient Information     Patient Name MRN Sex Edith Alford [id number] Female 1950      Progress Notes by Cyndie Andre MD at 2017 11:30 AM     Author:  Cyndie Andre MD Service:  (none) Author Type:  Physician     Filed:  2017 12:54 PM Encounter Date:  2017 Status:  Signed     :  Cyndie Andre MD (Physician)            SUBJECTIVE:    Edith Smith is a 67 y.o. female who presents for concern about a lesion on her face.    HPI Comments: Patient presents with a small skin lesion on her left upper cheek. She noticed it sometime this summer. She is concerned about some discoloration in the area of the skin lesion.  She generally has fair skin coloring with a history of extensive sun exposure.      REVIEW OF SYSTEMS:  [facility] history of present illness, review of systems is otherwise negative.    OBJECTIVE:  /62  Pulse 68  Ht 1.632 m (5' 4.25\")  Wt 60.4 kg (133 lb 3.2 oz)  BMI 22.69 kg/m2    EXAM:   Physical Exam   Constitutional: She is oriented to person, place, and time and well-developed, well-nourished, and in no distress.   Eyes: Conjunctivae are normal.   Cardiovascular: Normal rate.    Pulmonary/Chest: Effort normal.   Neurological: She is alert and oriented to person, place, and time.   Skin: No rash noted.   1 mm small pink papule on left upper cheek. Inferior portion with mild hyperpigmentation.   Psychiatric: Mood and affect normal.       ASSESSMENT/PLAN:    ICD-10-CM    1. Skin lesion L98.9 AMB CONSULT TO GENERAL SURGEON        Plan:  Would recommend excision to rule out basal cell carcinoma or other atypical cells. Patient is referred to general surgery for this.    Cyndie Andre MD          "
all other ROS negative except as per HPI

## 2024-05-02 ENCOUNTER — OFFICE VISIT (OUTPATIENT)
Dept: FAMILY MEDICINE | Facility: OTHER | Age: 74
End: 2024-05-02
Attending: FAMILY MEDICINE
Payer: COMMERCIAL

## 2024-05-02 VITALS
BODY MASS INDEX: 24.48 KG/M2 | RESPIRATION RATE: 18 BRPM | TEMPERATURE: 98.5 F | HEIGHT: 64 IN | WEIGHT: 143.4 LBS | HEART RATE: 66 BPM | DIASTOLIC BLOOD PRESSURE: 84 MMHG | SYSTOLIC BLOOD PRESSURE: 144 MMHG | OXYGEN SATURATION: 98 %

## 2024-05-02 DIAGNOSIS — R73.01 IFG (IMPAIRED FASTING GLUCOSE): Primary | ICD-10-CM

## 2024-05-02 DIAGNOSIS — E28.39 ESTROGEN DEFICIENCY: ICD-10-CM

## 2024-05-02 DIAGNOSIS — R03.0 ELEVATED BLOOD PRESSURE READING WITHOUT DIAGNOSIS OF HYPERTENSION: ICD-10-CM

## 2024-05-02 DIAGNOSIS — Z12.31 ENCOUNTER FOR SCREENING MAMMOGRAM FOR BREAST CANCER: ICD-10-CM

## 2024-05-02 DIAGNOSIS — M85.80 OSTEOPENIA, UNSPECIFIED LOCATION: ICD-10-CM

## 2024-05-02 DIAGNOSIS — E78.49 OTHER HYPERLIPIDEMIA: ICD-10-CM

## 2024-05-02 DIAGNOSIS — Z00.00 ENCOUNTER FOR MEDICARE ANNUAL WELLNESS EXAM: ICD-10-CM

## 2024-05-02 LAB
ALBUMIN SERPL BCG-MCNC: 4.7 G/DL (ref 3.5–5.2)
ALP SERPL-CCNC: 56 U/L (ref 40–150)
ALT SERPL W P-5'-P-CCNC: 26 U/L (ref 0–50)
ANION GAP SERPL CALCULATED.3IONS-SCNC: 9 MMOL/L (ref 7–15)
AST SERPL W P-5'-P-CCNC: 27 U/L (ref 0–45)
BASOPHILS # BLD AUTO: 0 10E3/UL (ref 0–0.2)
BASOPHILS NFR BLD AUTO: 1 %
BILIRUB SERPL-MCNC: 0.6 MG/DL
BUN SERPL-MCNC: 17.3 MG/DL (ref 8–23)
CALCIUM SERPL-MCNC: 9.2 MG/DL (ref 8.8–10.2)
CHLORIDE SERPL-SCNC: 105 MMOL/L (ref 98–107)
CHOLEST SERPL-MCNC: 167 MG/DL
CREAT SERPL-MCNC: 0.64 MG/DL (ref 0.51–0.95)
DEPRECATED HCO3 PLAS-SCNC: 27 MMOL/L (ref 22–29)
EGFRCR SERPLBLD CKD-EPI 2021: >90 ML/MIN/1.73M2
EOSINOPHIL # BLD AUTO: 0.1 10E3/UL (ref 0–0.7)
EOSINOPHIL NFR BLD AUTO: 3 %
ERYTHROCYTE [DISTWIDTH] IN BLOOD BY AUTOMATED COUNT: 12.7 % (ref 10–15)
FASTING STATUS PATIENT QL REPORTED: YES
GLUCOSE SERPL-MCNC: 117 MG/DL (ref 70–99)
HBA1C MFR BLD: 5.4 % (ref 4–6.2)
HCT VFR BLD AUTO: 39.7 % (ref 35–47)
HDLC SERPL-MCNC: 69 MG/DL
HGB BLD-MCNC: 13.2 G/DL (ref 11.7–15.7)
IMM GRANULOCYTES # BLD: 0 10E3/UL
IMM GRANULOCYTES NFR BLD: 0 %
LDLC SERPL CALC-MCNC: 87 MG/DL
LYMPHOCYTES # BLD AUTO: 1.7 10E3/UL (ref 0.8–5.3)
LYMPHOCYTES NFR BLD AUTO: 37 %
MCH RBC QN AUTO: 32.3 PG (ref 26.5–33)
MCHC RBC AUTO-ENTMCNC: 33.2 G/DL (ref 31.5–36.5)
MCV RBC AUTO: 97 FL (ref 78–100)
MONOCYTES # BLD AUTO: 0.4 10E3/UL (ref 0–1.3)
MONOCYTES NFR BLD AUTO: 8 %
NEUTROPHILS # BLD AUTO: 2.3 10E3/UL (ref 1.6–8.3)
NEUTROPHILS NFR BLD AUTO: 50 %
NONHDLC SERPL-MCNC: 98 MG/DL
NRBC # BLD AUTO: 0 10E3/UL
NRBC BLD AUTO-RTO: 0 /100
PLATELET # BLD AUTO: 233 10E3/UL (ref 150–450)
POTASSIUM SERPL-SCNC: 4.3 MMOL/L (ref 3.4–5.3)
PROT SERPL-MCNC: 7.1 G/DL (ref 6.4–8.3)
RBC # BLD AUTO: 4.09 10E6/UL (ref 3.8–5.2)
SODIUM SERPL-SCNC: 141 MMOL/L (ref 135–145)
TRIGL SERPL-MCNC: 56 MG/DL
WBC # BLD AUTO: 4.5 10E3/UL (ref 4–11)

## 2024-05-02 PROCEDURE — G0463 HOSPITAL OUTPT CLINIC VISIT: HCPCS

## 2024-05-02 PROCEDURE — G0439 PPPS, SUBSEQ VISIT: HCPCS | Performed by: FAMILY MEDICINE

## 2024-05-02 PROCEDURE — 85025 COMPLETE CBC W/AUTO DIFF WBC: CPT | Mod: ZL | Performed by: FAMILY MEDICINE

## 2024-05-02 PROCEDURE — 80053 COMPREHEN METABOLIC PANEL: CPT | Mod: ZL | Performed by: FAMILY MEDICINE

## 2024-05-02 PROCEDURE — 80061 LIPID PANEL: CPT | Mod: ZL | Performed by: FAMILY MEDICINE

## 2024-05-02 PROCEDURE — 83036 HEMOGLOBIN GLYCOSYLATED A1C: CPT | Mod: ZL | Performed by: FAMILY MEDICINE

## 2024-05-02 PROCEDURE — 36415 COLL VENOUS BLD VENIPUNCTURE: CPT | Mod: ZL | Performed by: FAMILY MEDICINE

## 2024-05-02 RX ORDER — ATORVASTATIN CALCIUM 20 MG/1
20 TABLET, FILM COATED ORAL DAILY
Qty: 90 TABLET | Refills: 4 | Status: SHIPPED | OUTPATIENT
Start: 2024-05-02

## 2024-05-02 SDOH — HEALTH STABILITY: PHYSICAL HEALTH: ON AVERAGE, HOW MANY DAYS PER WEEK DO YOU ENGAGE IN MODERATE TO STRENUOUS EXERCISE (LIKE A BRISK WALK)?: 1 DAY

## 2024-05-02 ASSESSMENT — PAIN SCALES - GENERAL: PAINLEVEL: NO PAIN (0)

## 2024-05-02 ASSESSMENT — SOCIAL DETERMINANTS OF HEALTH (SDOH): HOW OFTEN DO YOU GET TOGETHER WITH FRIENDS OR RELATIVES?: ONCE A WEEK

## 2024-05-02 NOTE — PROGRESS NOTES
Preventive Care Visit  [facility] AND [facility]  Cyndie Andre MD, Family Medicine  May 2, 2024      Assessment & Plan       ICD-10-CM    1. IFG (impaired fasting glucose)  R73.01 Hemoglobin A1c      2. Other hyperlipidemia  E78.49 atorvastatin (LIPITOR) 20 MG tablet     Lipid Panel      3. Osteopenia, unspecified location  M85.80 DX Bone Density      4. Estrogen deficiency  E28.39 DX Bone Density      5. Elevated blood pressure reading without diagnosis of hypertension  R03.0 Comprehensive Metabolic Panel     CBC with Platelets & Differential      6. Encounter for screening mammogram for breast cancer  Z12.31 MA Screen Bilateral w/Maik        Labs today.  Medications reviewed and refilled.  Reviewed last bone density scan.  T-score -1.4, FRAX score indicating treatment due to family history of hip fracture.  Will evaluate for stability, patient likely will not proceed with treatment as long as there is no evidence of continued bone loss.  This seems very reasonable.  Reviewed recommendations regarding mammogram screening at her age.  She would like to continue yearly mammograms at this point.  No further colon cancer screening recommended.  Refer back to audiology if she decides she wants to try hearing aids at some point.  Discussed memory concerns.  Patient encouraged to bring her  with to appointment if he has concerns.  Reviewed role of alcohol in cognitive decline, likely affecting sleep issues.  Patient is aware, no plans to decrease at this time.  Reviewed previous blood pressure readings.  Patient has a cuff at home and will monitor.  If systolic blood pressure consistently over 138, would recommend that she schedule follow-up appointment to discuss medication initiation.    Counseling  Appropriate preventive services were discussed with this patient, including applicable screening as appropriate for fall prevention, nutrition, physical activity, Tobacco-use cessation, weight loss and  cognition.  Checklist reviewing preventive services available has been given to the patient.  Reviewed patient's diet, addressing concerns and/or questions.   She is at risk for lack of exercise and has been provided with information to increase physical activity for the benefit of her well-being.   The patient was instructed to see the dentist every 6 months.   She is at risk for psychosocial distress and has been provided with information to reduce risk.   The patient was provided with written information regarding signs of hearing loss.         No follow-ups on file.    Delvin Sharma is a 74 year old, presenting for the following:  Medicare Visit        5/2/2024     8:37 AM   Additional Questions   Roomed by Suzy butler         Health Care Directive  Patient has a Health Care Directive on file  Advance care planning document is on file and is current.    HPI    Does realize that she did the clock wrong, almost immediately after she did it.  She wonders if maybe she heard the wrong time.  Is now able to remember all 3 words.  Notes that she does have to slow down or she will forget things at home.  Still in charge of all the finances at home.   has not indicated concerns.  Does not sleep great at night.  No current regular exercise.  Minimal social interacting with others.  Drinks 2 glasses of wine nightly.  Discussed that this is likely affecting sleep, detrimental to memory in addition to other health issues.  Patient is aware, but finds that this is an enjoyable part of her day.          5/2/2024   General Health   How would you rate your overall physical health? Good   Feel stress (tense, anxious, or unable to sleep) Only a little   (!) STRESS CONCERN      5/2/2024   Nutrition   Diet: Regular (no restrictions)         5/2/2024   Exercise   Days per week of moderate/strenous exercise 1 day   (!) EXERCISE CONCERN      5/2/2024   Social Factors   Frequency of gathering with friends or relatives Once a  week   Worry food won't last until get money to buy more No   Food not last or not have enough money for food? No   Do you have housing?  Yes   Are you worried about losing your housing? No   Lack of transportation? No   Unable to get utilities (heat,electricity)? No         2024   Fall Risk   Fallen 2 or more times in the past year? No   Trouble with walking or balance? No          2024   Activities of Daily Living- Home Safety   Needs help with the following daily activites None of the above   Safety concerns in the home None of the above         2024   Dental   Dentist two times every year? (!) NO         2024   Hearing Screening   Hearing concerns? (!) I NEED TO ASK PEOPLE TO SPEAK UP OR REPEAT THEMSELVES.         2024   Driving Risk Screening   Patient/family members have concerns about driving No         2024   General Alertness/Fatigue Screening   Have you been more tired than usual lately? No         2024   Urinary Incontinence Screening   Bothered by leaking urine in past 6 months No         2024   TB Screening   Were you born outside of the [place]? No         Today's PHQ-2 Score:       2024     8:33 AM   PHQ-2 (  [employer])   Q1: Little interest or pleasure in doing things 0   Q2: Feeling down, depressed or hopeless 0   PHQ-2 Score 0   Q1: Little interest or pleasure in doing things Not at all   Q2: Feeling down, depressed or hopeless Not at all   PHQ-2 Score 0           2024   Substance Use   Alcohol more than 3/day or more than 7/wk No   Do you have a current opioid prescription? No   How severe/bad is pain from 1 to 10? 0/10 (No Pain)   Do you use any other substances recreationally? (!) ALCOHOL    (!) CANNABIS PRODUCTS     Social History     Tobacco Use    Smoking status: Former     Current packs/day: 0.00     Types: Cigarettes     Quit date: 2000     Years since quittin.4    Smokeless tobacco: Never   Vaping Use    Vaping status: Never Used   Substance  Use Topics    Alcohol use: Yes     Alcohol/week: 5.8 standard drinks of alcohol     Comment: Alcoholic Drinks/day: slightly    Drug use: Yes     Types: Marijuana           4/13/2022   LAST FHS-7 RESULTS   1st degree relative breast or ovarian cancer Yes   Any relative bilateral breast cancer No   Any male have breast cancer No   Any ONE woman have BOTH breast AND ovarian cancer No   Any woman with breast cancer before 50yrs No   2 or more relatives with breast AND/OR ovarian cancer No   2 or more relatives with breast AND/OR bowel cancer No          ASCVD Risk   The 10-year ASCVD risk score (Pako QUEVEDO, et al., 2019) is: 21.8%    Values used to calculate the score:      Age: 74 years      Sex: Female      Is Non- : No      Diabetic: No      Tobacco smoker: No      Systolic Blood Pressure: 144 mmHg      Is BP treated: No      HDL Cholesterol: 66 mg/dL      Total Cholesterol: 157 mg/dL          Reviewed and updated as needed this visit by Provider                    Current providers sharing in care for this patient include:  Patient Care Team:  Cyndie Andre MD as PCP - General (Family Practice)  Cyndie Andre MD as Assigned PCP  Andi Villatoro MD as Assigned Surgical Provider    The following health maintenance items are reviewed in Epic and correct as of today:  Health Maintenance   Topic Date Due    RSV VACCINE (Pregnancy & 60+) (1 - 1-dose 60+ series) Never done    COVID-19 Vaccine (6 - 2023-24 season) 02/03/2024    COLORECTAL CANCER SCREENING  04/20/2024    LIPID  04/28/2024    MEDICARE ANNUAL WELLNESS VISIT  04/28/2024    FALL RISK ASSESSMENT  05/02/2025    GLUCOSE  04/28/2026    ADVANCE CARE PLANNING  05/02/2029    DTAP/TDAP/TD IMMUNIZATION (3 - Td or Tdap) 05/03/2033    DEXA  05/11/2036    HEPATITIS C SCREENING  Completed    PHQ-2 (once per calendar year)  Completed    INFLUENZA VACCINE  Completed    Pneumococcal Vaccine: 65+ Years  Completed    ZOSTER IMMUNIZATION   "Completed    IPV IMMUNIZATION  Aged Out    HPV IMMUNIZATION  Aged Out    MENINGITIS IMMUNIZATION  Aged Out    RSV MONOCLONAL ANTIBODY  Aged Out    MAMMO SCREENING  Discontinued        Objective    Exam  BP (!) 144/84   Pulse 66   Temp 98.5  F (36.9  C) (Tympanic)   Resp 18   Ht 1.619 m (5' 3.75\")   Wt 65 kg (143 lb 6.4 oz)   LMP  (LMP Unknown)   SpO2 98%   Breastfeeding No   BMI 24.81 kg/m     Estimated body mass index is 24.81 kg/m  as calculated from the following:    Height as of this encounter: 1.619 m (5' 3.75\").    Weight as of this encounter: 65 kg (143 lb 6.4 oz).    Physical Exam  Constitutional:       Appearance: She is well-developed.   Eyes:      Conjunctiva/sclera: Conjunctivae normal.   Neck:      Thyroid: No thyromegaly.   Cardiovascular:      Rate and Rhythm: Normal rate and regular rhythm.      Heart sounds: Normal heart sounds. No murmur heard.  Pulmonary:      Effort: Pulmonary effort is normal. No respiratory distress.      Breath sounds: Normal breath sounds.   Abdominal:      Palpations: Abdomen is soft.   Lymphadenopathy:      Cervical: No cervical adenopathy.   Skin:     Findings: No rash.   Neurological:      Mental Status: She is alert and oriented to person, place, and time.             5/2/2024   Mini Cog   Clock Draw Score 0 Abnormal   3 Item Recall 2 objects recalled   Mini Cog Total Score 2              Signed Electronically by: Cyndie Andre MD    "

## 2024-05-02 NOTE — NURSING NOTE
Patient is here for annual medicare wellness. No concerns at this time.     No LMP recorded (lmp unknown). Patient is postmenopausal.  Medication Reconciliation: complete    Suzy Carranza LPN 5/2/2024 8:46 AM       Advance care directive on file? yes  Advance care directive provided to patient? na       Suzy Carranza LPN

## 2024-05-02 NOTE — PATIENT INSTRUCTIONS
Preventive Care Advice   This is general advice given by our system to help you stay healthy. However, your care team may have specific advice just for you. Please talk to your care team about your preventive care needs.  Nutrition  Eat 5 or more servings of fruits and vegetables each day.  Try wheat bread, brown rice and whole grain pasta (instead of white bread, rice, and pasta).  Get enough calcium and vitamin D. Check the label on foods and aim for 100% of the RDA (recommended daily allowance).  Lifestyle  Exercise at least 150 minutes each week   (30 minutes a day, 5 days a week).  Do muscle strengthening activities 2 days a week. These help control your weight and prevent disease.  No smoking.  Wear sunscreen to prevent skin cancer.  Have a dental exam and cleaning every 6 months.  Yearly exams  See your health care team every year to talk about:  Any changes in your health.  Any medicines your care team has prescribed.  Preventive care, family planning, and ways to prevent chronic diseases.  Shots (vaccines)   HPV shots (up to age 26), if you've never had them before.  Hepatitis B shots (up to age 59), if you've never had them before.  COVID-19 shot: Get this shot when it's due.  Flu shot: Get a flu shot every year.  Tetanus shot: Get a tetanus shot every 10 years.  Pneumococcal, hepatitis A, and RSV shots: Ask your care team if you need these based on your risk.  Shingles shot (for age 50 and up).  General health tests  Diabetes screening:  Starting at age 35, Get screened for diabetes at least every 3 years.  If you are younger than age 35, ask your care team if you should be screened for diabetes.  Cholesterol test: At age 39, start having a cholesterol test every 5 years, or more often if advised.  Bone density scan (DEXA): At age 50, ask your care team if you should have this scan for osteoporosis (brittle bones).  Hepatitis C: Get tested at least once in your life.  STIs (sexually transmitted  infections)  Before age 24: Ask your care team if you should be screened for STIs.  After age 24: Get screened for STIs if you're at risk. You are at risk for STIs (including HIV) if:  You are sexually active with more than one person.  You don't use condoms every time.  You or a partner was diagnosed with a sexually transmitted infection.  If you are at risk for HIV, ask about PrEP medicine to prevent HIV.  Get tested for HIV at least once in your life, whether you are at risk for HIV or not.  Cancer screening tests  Cervical cancer screening: If you have a cervix, begin getting regular cervical cancer screening tests at age 21. Most people who have regular screenings with normal results can stop after age 65. Talk about this with your provider.  Breast cancer scan (mammogram): If you've ever had breasts, begin having regular mammograms starting at age 40. This is a scan to check for breast cancer.  Colon cancer screening: It is important to start screening for colon cancer at age 45.  Have a colonoscopy test every 10 years (or more often if you're at risk) Or, ask your provider about stool tests like a FIT test every year or Cologuard test every 3 years.  To learn more about your testing options, visit: [email or web address]/[email or web address].  For help making a decision, visit: https://bit.ly/ny42622.  Prostate cancer screening test: If you have a prostate and are age 55 to 69, ask your provider if you would benefit from a yearly prostate cancer screening test.  Lung cancer screening: If you are a current or former smoker age 50 to 80, ask your care team if ongoing lung cancer screenings are right for you.  For informational purposes only. Not to replace the advice of your health care provider. Copyright   2023 [employer]. All rights reserved. Clinically reviewed by the [employer] [facility] Transitions Program. [employer] 215131 - REV 01/24.    Hearing Loss: Care Instructions  Overview     Hearing loss is a  sudden or slow decrease in how well you hear. It can range from slight to profound. Permanent hearing loss can occur with aging. It also can happen when you are exposed long-term to loud noise. Examples include listening to loud music, riding motorcycles, or being around other loud machines.  Hearing loss can affect your work and home life. It can make you feel lonely or depressed. You may feel that you have lost your independence. But hearing aids and other devices can help you hear better and feel connected to others.  Follow-up care is a key part of your treatment and safety. Be sure to make and go to all appointments, and call your doctor if you are having problems. It's also a good idea to know your test results and keep a list of the medicines you take.  How can you care for yourself at home?  Avoid loud noises whenever possible. This helps keep your hearing from getting worse.  Always wear hearing protection around loud noises.  Wear a hearing aid as directed.  A professional can help you pick a hearing aid that will work best for you.  You can also get hearing aids over the counter for mild to moderate hearing loss.  Have hearing tests as your doctor suggests. They can show whether your hearing has changed. Your hearing aid may need to be adjusted.  Use other devices as needed. These may include:  Telephone amplifiers and hearing aids that can connect to a television, stereo, radio, or microphone.  Devices that use lights or vibrations. These alert you to the doorbell, a ringing telephone, or a baby monitor.  Television closed-captioning. This shows the words at the bottom of the screen. Most new TVs can do this.  TTY (text telephone). This lets you type messages back and forth on the telephone instead of talking or listening. These devices are also called TDD. When messages are typed on the keyboard, they are sent over the phone line to a receiving TTY. The message is shown on a monitor.  Use text  "messaging, social media, and email if it is hard for you to communicate by telephone.  Try to learn a listening technique called speechreading. It is not lipreading. You pay attention to people's gestures, expressions, posture, and tone of voice. These clues can help you understand what a person is saying. Face the person you are talking to, and have them face you. Make sure the lighting is good. You need to see the other person's face clearly.  Think about counseling if you need help to adjust to your hearing loss.  When should you call for help?  Watch closely for changes in your health, and be sure to contact your doctor if:    You think your hearing is getting worse.     You have new symptoms, such as dizziness or nausea.   Where can you learn more?  Go to https://www.[employer].net/patiented  Enter R798 in the search box to learn more about \"Hearing Loss: Care Instructions.\"  Current as of: September 27, 2023               Content Version: 14.0    7047-9442 [email or web address].   Care instructions adapted under license by your healthcare professional. If you have questions about a medical condition or this instruction, always ask your healthcare professional. [email or web address] disclaims any warranty or liability for your use of this information.      Learning About Stress  What is stress?     Stress is your body's response to a hard situation. Your body can have a physical, emotional, or mental response. Stress is a fact of life for most people, and it affects everyone differently. What causes stress for you may not be stressful for someone else.  A lot of things can cause stress. You may feel stress when you go on a job interview, take a test, or run a race. This kind of short-term stress is normal and even useful. It can help you if you need to work hard or react quickly. For example, stress can help you finish an important job on time.  Long-term stress is caused by ongoing stressful situations " or events. Examples of long-term stress include long-term health problems, ongoing problems at work, or conflicts in your family. Long-term stress can harm your health.  How does stress affect your health?  When you are stressed, your body responds as though you are in danger. It makes hormones that speed up your heart, make you breathe faster, and give you a burst of energy. This is called the fight-or-flight stress response. If the stress is over quickly, your body goes back to normal and no harm is done.  But if stress happens too often or lasts too long, it can have bad effects. Long-term stress can make you more likely to get sick, and it can make symptoms of some diseases worse. If you tense up when you are stressed, you may develop neck, shoulder, or low back pain. Stress is linked to high blood pressure and heart disease.  Stress also harms your emotional health. It can make you coffman, tense, or depressed. Your relationships may suffer, and you may not do well at work or school.  What can you do to manage stress?  You can try these things to help manage stress:   Do something active. Exercise or activity can help reduce stress. Walking is a great way to get started. Even everyday activities such as housecleaning or yard work can help.  Try yoga or pierre chi. These techniques combine exercise and meditation. You may need some training at first to learn them.  Do something you enjoy. For example, listen to music or go to a movie. Practice your hobby or do volunteer work.  Meditate. This can help you relax, because you are not worrying about what happened before or what may happen in the future.  Do guided imagery. Imagine yourself in any setting that helps you feel calm. You can use online videos, books, or a teacher to guide you.  Do breathing exercises. For example:  From a standing position, bend forward from the waist with your knees slightly bent. Let your arms dangle close to the floor.  Breathe in slowly  "and deeply as you return to a standing position. Roll up slowly and lift your head last.  Hold your breath for just a few seconds in the standing position.  Breathe out slowly and bend forward from the waist.  Let your feelings out. Talk, laugh, cry, and express anger when you need to. Talking with supportive friends or family, a counselor, or a lamont leader about your feelings is a healthy way to relieve stress. Avoid discussing your feelings with people who make you feel worse.  Write. It may help to write about things that are bothering you. This helps you find out how much stress you feel and what is causing it. When you know this, you can find better ways to cope.  What can you do to prevent stress?  You might try some of these things to help prevent stress:  Manage your time. This helps you find time to do the things you want and need to do.  Get enough sleep. Your body recovers from the stresses of the day while you are sleeping.  Get support. Your family, friends, and community can make a difference in how you experience stress.  Limit your news feed. Avoid or limit time on social media or news that may make you feel stressed.  Do something active. Exercise or activity can help reduce stress. Walking is a great way to get started.  Where can you learn more?  Go to https://www.[employer].net/patiented  Enter N032 in the search box to learn more about \"Learning About Stress.\"  Current as of: October 24, 2023               Content Version: 14.0    1014-5736 [employer].   Care instructions adapted under license by your healthcare professional. If you have questions about a medical condition or this instruction, always ask your healthcare professional. [employer] disclaims any warranty or liability for your use of this information.      Substance Use Disorder: Care Instructions  Overview     You can improve your life and health by stopping your use of alcohol or drugs. When you don't " drink or use drugs, you may feel and sleep better. You may get along better with your family, friends, and coworkers. There are medicines and programs that can help with substance use disorder.  How can you care for yourself at home?  Here are some ways to help you stay sober and prevent relapse.  If you have been given medicine to help keep you sober or reduce your cravings, be sure to take it exactly as prescribed.  Talk to your doctor about programs that can help you stop using drugs or drinking alcohol.  Do not keep alcohol or drugs in your home.  Plan ahead. Think about what you'll say if other people ask you to drink or use drugs. Try not to spend time with people who drink or use drugs.  Use the time and money spent on drinking or drugs to do something that's important to you.  Preventing a relapse  Have a plan to deal with relapse. Learn to recognize changes in your thinking that lead you to drink or use drugs. Get help before you start to drink or use drugs again.  Try to stay away from situations, friends, or places that may lead you to drink or use drugs.  If you feel the need to drink alcohol or use drugs again, seek help right away. Call a trusted friend or family member. Some people get support from organizations such as Narcotics Anonymous or [employer] or from treatment facilities.  If you relapse, get help as soon as you can. Some people make a plan with another person that outlines what they want that person to do for them if they relapse. The plan usually includes how to handle the relapse and who to notify in case of relapse.  Don't give up. Remember that a relapse doesn't mean that you have failed. Use the experience to learn the triggers that lead you to drink or use drugs. Then quit again. Recovery is a lifelong process. Many people have several relapses before they are able to quit for good.  Follow-up care is a key part of your treatment and safety. Be sure to make and go to all  "appointments, and call your doctor if you are having problems. It's also a good idea to know your test results and keep a list of the medicines you take.  When should you call for help?   Call 911  anytime you think you may need emergency care. For example, call if you or someone else:    Has overdosed or has withdrawal signs. Be sure to tell the emergency workers that you are or someone else is using or trying to quit using drugs. Overdose or withdrawal signs may include:  Losing consciousness.  Seizure.  Seeing or hearing things that aren't there (hallucinations).     Is thinking or talking about suicide or harming others.   Where to get help 24 hours a day, 7 days a week   If you or someone you know talks about suicide, self-harm, a mental health crisis, a substance use crisis, or any other kind of emotional distress, get help right away. You can:    Call the Suicide and Crisis Lifeline at 988.     Call 1-815-292-TALK (1-235.321.8096).     Text HOME to 613852 to access the Crisis Text Line.   Consider saving these numbers in your phone.  Go to [employer] for more information or to chat online.  Call your doctor now or seek immediate medical care if:    You are having withdrawal symptoms. These may include nausea or vomiting, sweating, shakiness, and anxiety.   Watch closely for changes in your health, and be sure to contact your doctor if:    You have a relapse.     You need more help or support to stop.   Where can you learn more?  Go to https://www.[employer].net/patiented  Enter H573 in the search box to learn more about \"Substance Use Disorder: Care Instructions.\"  Current as of: November 15, 2023               Content Version: 14.0    8634-0673 [employer].   Care instructions adapted under license by your healthcare professional. If you have questions about a medical condition or this instruction, always ask your healthcare professional. [employer] disclaims any warranty or " liability for your use of this information.

## 2024-05-10 ENCOUNTER — MYC MEDICAL ADVICE (OUTPATIENT)
Dept: FAMILY MEDICINE | Facility: OTHER | Age: 74
End: 2024-05-10
Payer: COMMERCIAL

## 2024-06-07 ENCOUNTER — HOSPITAL ENCOUNTER (OUTPATIENT)
Dept: MAMMOGRAPHY | Facility: OTHER | Age: 74
Discharge: HOME | End: 2024-06-07
Attending: FAMILY MEDICINE
Payer: MEDICARE

## 2024-06-07 DIAGNOSIS — Z12.31 ENCOUNTER FOR SCREENING MAMMOGRAM FOR BREAST CANCER: ICD-10-CM

## 2024-06-07 PROCEDURE — 77063 BREAST TOMOSYNTHESIS BI: CPT

## 2024-06-18 ENCOUNTER — HOSPITAL ENCOUNTER (OUTPATIENT)
Dept: BONE DENSITY | Facility: OTHER | Age: 74
Discharge: HOME | End: 2024-06-18
Attending: FAMILY MEDICINE | Admitting: FAMILY MEDICINE
Payer: MEDICARE

## 2024-06-18 DIAGNOSIS — E28.39 ESTROGEN DEFICIENCY: ICD-10-CM

## 2024-06-18 DIAGNOSIS — M85.80 OSTEOPENIA, UNSPECIFIED LOCATION: ICD-10-CM

## 2024-06-18 PROCEDURE — 77080 DXA BONE DENSITY AXIAL: CPT

## 2024-11-26 ENCOUNTER — OFFICE VISIT (OUTPATIENT)
Dept: FAMILY MEDICINE | Facility: OTHER | Age: 74
End: 2024-11-26
Attending: FAMILY MEDICINE
Payer: MEDICARE

## 2024-11-26 DIAGNOSIS — T16.2XXA FOREIGN BODY OF LEFT EAR, INITIAL ENCOUNTER: Primary | ICD-10-CM

## 2024-11-26 PROCEDURE — G0463 HOSPITAL OUTPT CLINIC VISIT: HCPCS

## 2024-11-26 NOTE — NURSING NOTE
Patient present to clinic requesting to have someone look in ear to see if part of hearing aid was stuck. Patient was assessed by RN, requesting to have Keyshawn Back MD remove object.   Suzy Carranza LPN .............11/26/2024     9:27 AM

## 2024-11-26 NOTE — PROGRESS NOTES
Assessment & Plan     Foreign body of left ear, initial encounter  Foreign body removed in its entirety.  Remaining exam normal.                No follow-ups on file.    Delvin Sharma is a 74 year old, presenting for the following health issues:    HPI     74-year-old female presents as a walk-in patient this morning.  She has part of her hearing aid stuck in her left ear.  She discovered that this morning.  No pain.  No drainage.                Objective    LMP  (LMP Unknown)   Breastfeeding No   There is no height or weight on file to calculate BMI.  Physical Exam   Examination of the left ear reveals small plastic piece lodged in the external canal.  It is grabbed with an alligator and gently removed.  It is intact.  No other foreign body.  TM is visualized.  Noninflamed.  Ear canal slightly irritated from the patient digging out of earlier today.            Signed Electronically by: Elaine Back MD

## 2025-01-01 ENCOUNTER — HOSPITAL ENCOUNTER (EMERGENCY)
Facility: OTHER | Age: 75
Discharge: HOME | End: 2025-01-01
Attending: STUDENT IN AN ORGANIZED HEALTH CARE EDUCATION/TRAINING PROGRAM
Payer: MEDICARE

## 2025-01-01 ENCOUNTER — APPOINTMENT (OUTPATIENT)
Dept: GENERAL RADIOLOGY | Facility: OTHER | Age: 75
End: 2025-01-01
Attending: STUDENT IN AN ORGANIZED HEALTH CARE EDUCATION/TRAINING PROGRAM
Payer: MEDICARE

## 2025-01-01 VITALS
DIASTOLIC BLOOD PRESSURE: 85 MMHG | BODY MASS INDEX: 23.9 KG/M2 | SYSTOLIC BLOOD PRESSURE: 141 MMHG | WEIGHT: 140 LBS | HEIGHT: 64 IN | TEMPERATURE: 99.2 F | RESPIRATION RATE: 33 BRPM | HEART RATE: 66 BPM | OXYGEN SATURATION: 97 %

## 2025-01-01 DIAGNOSIS — R53.81 MALAISE: ICD-10-CM

## 2025-01-01 DIAGNOSIS — I10 PRIMARY HYPERTENSION: ICD-10-CM

## 2025-01-01 LAB
ALBUMIN SERPL BCG-MCNC: 4.5 G/DL (ref 3.5–5.2)
ALP SERPL-CCNC: 57 U/L (ref 40–150)
ALT SERPL W P-5'-P-CCNC: 22 U/L (ref 0–50)
ANION GAP SERPL CALCULATED.3IONS-SCNC: 11 MMOL/L (ref 7–15)
AST SERPL W P-5'-P-CCNC: 26 U/L (ref 0–45)
BASOPHILS # BLD AUTO: 0 10E3/UL (ref 0–0.2)
BASOPHILS NFR BLD AUTO: 1 %
BILIRUB DIRECT SERPL-MCNC: <0.2 MG/DL (ref 0–0.3)
BILIRUB SERPL-MCNC: 0.5 MG/DL
BUN SERPL-MCNC: 17.7 MG/DL (ref 8–23)
CALCIUM SERPL-MCNC: 9.3 MG/DL (ref 8.8–10.4)
CHLORIDE SERPL-SCNC: 105 MMOL/L (ref 98–107)
CREAT SERPL-MCNC: 0.73 MG/DL (ref 0.51–0.95)
D DIMER PPP FEU-MCNC: 0.33 UG/ML FEU (ref 0–0.5)
EGFRCR SERPLBLD CKD-EPI 2021: 85 ML/MIN/1.73M2
EOSINOPHIL # BLD AUTO: 0.1 10E3/UL (ref 0–0.7)
EOSINOPHIL NFR BLD AUTO: 2 %
ERYTHROCYTE [DISTWIDTH] IN BLOOD BY AUTOMATED COUNT: 13.2 % (ref 10–15)
FLUAV RNA SPEC QL NAA+PROBE: NEGATIVE
FLUBV RNA RESP QL NAA+PROBE: NEGATIVE
GLUCOSE SERPL-MCNC: 118 MG/DL (ref 70–99)
HCO3 SERPL-SCNC: 25 MMOL/L (ref 22–29)
HCT VFR BLD AUTO: 36.2 % (ref 35–47)
HGB BLD-MCNC: 12.4 G/DL (ref 11.7–15.7)
HOLD SPECIMEN: NORMAL
IMM GRANULOCYTES # BLD: 0 10E3/UL
IMM GRANULOCYTES NFR BLD: 0 %
LYMPHOCYTES # BLD AUTO: 2.2 10E3/UL (ref 0.8–5.3)
LYMPHOCYTES NFR BLD AUTO: 40 %
MCH RBC QN AUTO: 32 PG (ref 26.5–33)
MCHC RBC AUTO-ENTMCNC: 34.3 G/DL (ref 31.5–36.5)
MCV RBC AUTO: 94 FL (ref 78–100)
MONOCYTES # BLD AUTO: 0.5 10E3/UL (ref 0–1.3)
MONOCYTES NFR BLD AUTO: 9 %
NEUTROPHILS # BLD AUTO: 2.7 10E3/UL (ref 1.6–8.3)
NEUTROPHILS NFR BLD AUTO: 48 %
NRBC # BLD AUTO: 0 10E3/UL
NRBC BLD AUTO-RTO: 0 /100
PLATELET # BLD AUTO: 250 10E3/UL (ref 150–450)
POTASSIUM SERPL-SCNC: 4.1 MMOL/L (ref 3.4–5.3)
PROT SERPL-MCNC: 6.9 G/DL (ref 6.4–8.3)
RBC # BLD AUTO: 3.87 10E6/UL (ref 3.8–5.2)
RSV RNA SPEC NAA+PROBE: NEGATIVE
SARS-COV-2 RNA RESP QL NAA+PROBE: NEGATIVE
SODIUM SERPL-SCNC: 141 MMOL/L (ref 135–145)
TROPONIN T SERPL HS-MCNC: 6 NG/L
WBC # BLD AUTO: 5.5 10E3/UL (ref 4–11)

## 2025-01-01 PROCEDURE — 99285 EMERGENCY DEPT VISIT HI MDM: CPT | Mod: 25 | Performed by: STUDENT IN AN ORGANIZED HEALTH CARE EDUCATION/TRAINING PROGRAM

## 2025-01-01 PROCEDURE — 250N000013 HC RX MED GY IP 250 OP 250 PS 637: Performed by: STUDENT IN AN ORGANIZED HEALTH CARE EDUCATION/TRAINING PROGRAM

## 2025-01-01 PROCEDURE — 87637 SARSCOV2&INF A&B&RSV AMP PRB: CPT | Performed by: STUDENT IN AN ORGANIZED HEALTH CARE EDUCATION/TRAINING PROGRAM

## 2025-01-01 PROCEDURE — 80053 COMPREHEN METABOLIC PANEL: CPT | Performed by: STUDENT IN AN ORGANIZED HEALTH CARE EDUCATION/TRAINING PROGRAM

## 2025-01-01 PROCEDURE — 93010 ELECTROCARDIOGRAM REPORT: CPT | Performed by: INTERNAL MEDICINE

## 2025-01-01 PROCEDURE — 85048 AUTOMATED LEUKOCYTE COUNT: CPT | Performed by: STUDENT IN AN ORGANIZED HEALTH CARE EDUCATION/TRAINING PROGRAM

## 2025-01-01 PROCEDURE — 84484 ASSAY OF TROPONIN QUANT: CPT | Performed by: STUDENT IN AN ORGANIZED HEALTH CARE EDUCATION/TRAINING PROGRAM

## 2025-01-01 PROCEDURE — 99284 EMERGENCY DEPT VISIT MOD MDM: CPT | Performed by: STUDENT IN AN ORGANIZED HEALTH CARE EDUCATION/TRAINING PROGRAM

## 2025-01-01 PROCEDURE — 93005 ELECTROCARDIOGRAM TRACING: CPT | Performed by: STUDENT IN AN ORGANIZED HEALTH CARE EDUCATION/TRAINING PROGRAM

## 2025-01-01 PROCEDURE — 85379 FIBRIN DEGRADATION QUANT: CPT | Performed by: STUDENT IN AN ORGANIZED HEALTH CARE EDUCATION/TRAINING PROGRAM

## 2025-01-01 PROCEDURE — 36415 COLL VENOUS BLD VENIPUNCTURE: CPT | Performed by: STUDENT IN AN ORGANIZED HEALTH CARE EDUCATION/TRAINING PROGRAM

## 2025-01-01 PROCEDURE — 71045 X-RAY EXAM CHEST 1 VIEW: CPT

## 2025-01-01 PROCEDURE — 82248 BILIRUBIN DIRECT: CPT | Performed by: STUDENT IN AN ORGANIZED HEALTH CARE EDUCATION/TRAINING PROGRAM

## 2025-01-01 PROCEDURE — 85014 HEMATOCRIT: CPT | Performed by: STUDENT IN AN ORGANIZED HEALTH CARE EDUCATION/TRAINING PROGRAM

## 2025-01-01 RX ORDER — LOSARTAN POTASSIUM 25 MG/1
25 TABLET ORAL DAILY
Qty: 90 TABLET | Refills: 0 | Status: SHIPPED | OUTPATIENT
Start: 2025-01-01

## 2025-01-01 RX ORDER — LOSARTAN POTASSIUM 25 MG/1
25 TABLET ORAL DAILY
Status: DISCONTINUED | OUTPATIENT
Start: 2025-01-01 | End: 2025-01-01 | Stop reason: HOSPADM

## 2025-01-01 RX ADMIN — LOSARTAN POTASSIUM 25 MG: 25 TABLET, FILM COATED ORAL at 16:42

## 2025-01-01 ASSESSMENT — ACTIVITIES OF DAILY LIVING (ADL)
ADLS_ACUITY_SCORE: 41

## 2025-01-01 ASSESSMENT — COLUMBIA-SUICIDE SEVERITY RATING SCALE - C-SSRS
1. IN THE PAST MONTH, HAVE YOU WISHED YOU WERE DEAD OR WISHED YOU COULD GO TO SLEEP AND NOT WAKE UP?: NO
2. HAVE YOU ACTUALLY HAD ANY THOUGHTS OF KILLING YOURSELF IN THE PAST MONTH?: NO
6. HAVE YOU EVER DONE ANYTHING, STARTED TO DO ANYTHING, OR PREPARED TO DO ANYTHING TO END YOUR LIFE?: NO

## 2025-01-01 NOTE — ED PROVIDER NOTES
Emergency Department Provider Note  : 1950 Age: 74 year old Sex: female MRN: [id number]    Chief Complaint   Patient presents with    Chest Pain    Hypertension       Medical Decision Making / Assessment / Plan   74 year old female presenting with chest pain and elevated blood pressure.  Troponin, chest x-ray and D-dimer were negative.  Blood pressures were persistently in the 130s to the 160s in the emergency department and have chronically been elevated at home so will initiate losartan 25 mg daily.  She will follow-up with her PCP as scheduled in approximately 3 weeks for repeat of labs.  Discussed checking randomly her blood pressure 3-4 times per week and keeping a log for her PCP follow-up.        The patient was informed of the plan and verbalized understanding and agreed with the plan. The patient was given strict return to Emergency Department precautions as well as appropriate follow up instructions. The patient was discharged in stable condition.    New Prescriptions    LOSARTAN (COZAAR) 25 MG TABLET    Take 1 tablet (25 mg) by mouth daily.       Final diagnoses:   Primary hypertension   Malaise       Irving Rivera MD  2025   Emergency Department    Delvin Sharma is a 74 year old female with a past medical history of hyperlipidemia who presents emergency department with chest pain and elevated blood pressure.    The patient has had intermittent elevated blood pressure with systolic blood pressures in the 130s at home.  She is not on any antihypertensive agents.  She has reduced salt in her diet.  She has noticed over the past week or so that her blood pressures have been higher with systolic blood pressures up to the 160s today.  She was taken down her [place] tree and she had some mild chest pain in the center of her chest.  It did not radiate.  She has had less stamina but no focal weakness or numbness.   Her  endorses that she gets anxious when her blood pressure is  "elevated.  No fever or chills.  No nausea or vomiting.  No sick contacts.      I have reviewed the Medications, Allergies, Past Medical and Surgical History, and Social History in the Epic System and with family.    Review of Systems:  Please see Subjective / HPI for pertinent positives and negatives. All other systems reviewed and found to be negative.      Objective   Patient Vitals for the past 24 hrs:   BP Temp Temp src Pulse Resp SpO2 Height Weight   01/01/25 1600 (!) 158/90 -- -- 73 22 98 % -- --   01/01/25 1550 (!) 146/84 -- -- 68 20 97 % -- --   01/01/25 1530 (!) 141/83 -- -- 71 23 98 % -- --   01/01/25 1520 134/78 -- -- 69 13 98 % -- --   01/01/25 1500 137/77 -- -- 67 27 98 % -- --   01/01/25 1450 135/71 -- -- 66 16 97 % -- --   01/01/25 1430 137/76 -- -- 69 17 98 % -- --   01/01/25 1420 138/82 -- -- 67 21 99 % -- --   01/01/25 1400 (!) 165/97 -- -- 75 -- 98 % -- --   01/01/25 1356 (!) 175/80 99.2  F (37.3  C) Tympanic 65 20 99 % 1.619 m (5' 3.75\") 63.5 kg (140 lb)       Physical Exam:   General: Pleasant 74-year-old woman lying in bed accompanied by her  no acute distress  HEENT: Oropharynx clear  CV: Regular rate and rhythm  Pulmonary: Clear to auscultation  GI: Soft nontender abdomen    Procedures / Critical Care   Procedures    Aggregate Critical Care Time: None.    Orders Placed This Encounter   Procedures    XR Chest Port 1 View    D dimer quantitative    Basic metabolic panel    Hepatic function panel    Troponin T, High Sensitivity    CBC with platelets and differential    Extra Tube    Extra Red Top Tube    Influenza A/B, RSV and SARS-CoV2 PCR (COVID-19)    EKG 12-lead, tracing only    Peripheral IV catheter    Cardiac Continuous Monitoring    CBC with platelets differential       RESULTS:  Results for orders placed or performed during the hospital encounter of 01/01/25   XR Chest Port 1 View     Status: None    Narrative    EXAM: XR CHEST PORT 1 VIEW  LOCATION: [facility]  DATE: 1/1/2025    INDICATION: chest pain  COMPARISON: None.      Impression    IMPRESSION: Healed right rib fractures with adjacent scarring/atelectasis. Otherwise lungs are clear. No pleural effusion or pneumothorax. Normal heart size and mediastinal contours.   D dimer quantitative     Status: Normal   Result Value Ref Range    D-Dimer Quantitative 0.33 0.00 - 0.50 ug/mL FEU    Narrative    This D-dimer assay is intended for use in conjunction with a clinical pretest probability assessment model to exclude pulmonary embolism (PE) and deep venous thrombosis (DVT) in outpatients suspected of PE or DVT. The cut-off value is 0.50 ug/mL FEU.    For patients 50 years of age or older, the application of age-adjusted cut-off values for D-Dimer may increase the specificity without significant effect on sensitivity. The literature suggested calculation age adjusted cut-off in ug/L = age in years x 10 ug/L. The results in this laboratory are reported as ug/mL rather than ug/L. The calculation for age adjusted cut off in ug/mL= age in years x 0.01 ug/mL. For example, the cut off for a 76 year old male is 76 x 0.01 ug/mL = 0.76 ug/mL (760 ug/L).    M Navjothitrish et al. Age adjusted D-dimer cut-off levels to rule out pulmonary embolism: The ADJUST-PE Study. NOE 2014;311:5441-1525.; HJ Surendra et al. Diagnostic accuracy of conventional or age adjusted D-dimer cutoff values in older patients with suspected venous thromboembolism. Systemic review and meta-analysis. BMJ 2013:346:f2492.   Basic metabolic panel     Status: Abnormal   Result Value Ref Range    Sodium 141 135 - 145 mmol/L    Potassium 4.1 3.4 - 5.3 mmol/L    Chloride 105 98 - 107 mmol/L    Carbon Dioxide (CO2) 25 22 - 29 mmol/L    Anion Gap 11 7 - 15 mmol/L    Urea Nitrogen 17.7 8.0 - 23.0 mg/dL    Creatinine 0.73 0.51 - 0.95 mg/dL    GFR Estimate 85 >60 mL/min/1.73m2    Calcium 9.3 8.8 - 10.4 mg/dL    Glucose 118 (H) 70 - 99 mg/dL   Hepatic function panel      Status: Normal   Result Value Ref Range    Protein Total 6.9 6.4 - 8.3 g/dL    Albumin 4.5 3.5 - 5.2 g/dL    Bilirubin Total 0.5 <=1.2 mg/dL    Alkaline Phosphatase 57 40 - 150 U/L    AST 26 0 - 45 U/L    ALT 22 0 - 50 U/L    Bilirubin Direct <0.20 0.00 - 0.30 mg/dL   Troponin T, High Sensitivity     Status: Normal   Result Value Ref Range    Troponin T, High Sensitivity 6 <=14 ng/L   CBC with platelets and differential     Status: None   Result Value Ref Range    WBC Count 5.5 4.0 - 11.0 10e3/uL    RBC Count 3.87 3.80 - 5.20 10e6/uL    Hemoglobin 12.4 11.7 - 15.7 g/dL    Hematocrit 36.2 35.0 - 47.0 %    MCV 94 78 - 100 fL    MCH 32.0 26.5 - 33.0 pg    MCHC 34.3 31.5 - 36.5 g/dL    RDW 13.2 10.0 - 15.0 %    Platelet Count 250 150 - 450 10e3/uL    % Neutrophils 48 %    % Lymphocytes 40 %    % Monocytes 9 %    % Eosinophils 2 %    % Basophils 1 %    % Immature Granulocytes 0 %    NRBCs per 100 WBC 0 <1 /100    Absolute Neutrophils 2.7 1.6 - 8.3 10e3/uL    Absolute Lymphocytes 2.2 0.8 - 5.3 10e3/uL    Absolute Monocytes 0.5 0.0 - 1.3 10e3/uL    Absolute Eosinophils 0.1 0.0 - 0.7 10e3/uL    Absolute Basophils 0.0 0.0 - 0.2 10e3/uL    Absolute Immature Granulocytes 0.0 <=0.4 10e3/uL    Absolute NRBCs 0.0 10e3/uL   Extra Tube     Status: None    Narrative    The following orders were created for panel order Extra Tube.  Procedure                               Abnormality         Status                     ---------                               -----------         ------                     Extra Red Top Tube[[id number]]                               Final result                 Please view results for these tests on the individual orders.   Extra Red Top Tube     Status: None   Result Value Ref Range    Hold Specimen x    Influenza A/B, RSV and SARS-CoV2 PCR (COVID-19) Nasopharyngeal     Status: Normal    Specimen: Nasopharyngeal; Swab   Result Value Ref Range    Influenza A PCR Negative Negative    Influenza B PCR  Negative Negative    RSV PCR Negative Negative    SARS CoV2 PCR Negative Negative    Narrative    Testing was performed using the Xpert Xpress CoV2/Flu/RSV Assay on the [employer] GeneXpert Instrument. This test should be ordered for the detection of SARS-CoV2, influenza, and RSV viruses in individuals with signs and symptoms of respiratory tract infection. This test is for in vitro diagnostic use under the [place] FDA for laboratories certified under CLIA to perform high or moderate complexity testing. This test has been [place] FDA cleared. A negative result does not rule out the presence of PCR inhibitors in the specimen or target RNA in concentration below the limit of detection for the assay. If only one viral target is positive but coinfection with multiple targets is suspected, the sample should be re-tested with another FDA cleared, approved, or authorized test, if coninfection would change clinical management. This test was validated by the [facility]. These laboratories are certified under the Clinical Laboratory Improvement Amendments of 1988 (CLIA-88) as qualified to perfom high complexity laboratory testing.   CBC with platelets differential     Status: None    Narrative    The following orders were created for panel order CBC with platelets differential.  Procedure                               Abnormality         Status                     ---------                               -----------         ------                     CBC with platelets and d...[[id number]]                      Final result                 Please view results for these tests on the individual orders.               Medical/Surgical History:  Past Medical History:   Diagnosis Date    Encounter for full-term uncomplicated delivery     x 1    Lyme disease     7/13/2012     Past Surgical History:   Procedure Laterality Date    COLONOSCOPY  04/07/2011    Hyperplastic polyps only.  Next due 2021    DILATION AND CURETTAGE       age 17    EXTRACAPSULAR CATARACT EXTRATION WITH INTRAOCULAR LENS IMPLANT      2011,Dr. Beltran. rt    EXTRACAPSULAR CATARACT EXTRATION WITH INTRAOCULAR LENS IMPLANT      10/2013,Dr. Walden, left    LAPAROSCOPIC TUBAL LIGATION      age 38       Medications:  Current Facility-Administered Medications   Medication Dose Route Frequency Provider Last Rate Last Admin    losartan (COZAAR) tablet 25 mg  25 mg Oral Daily Irving Rivera MD         Current Outpatient Medications   Medication Sig Dispense Refill    atorvastatin (LIPITOR) 20 MG tablet Take 1 tablet (20 mg) by mouth daily 90 tablet 4    losartan (COZAAR) 25 MG tablet Take 1 tablet (25 mg) by mouth daily. 90 tablet 0    calcium 600 MG tablet Take 2 tablets by mouth daily       Multiple Vitamins-Minerals (CENTRUM SILVER) per tablet Take 1 tablet by mouth daily      psyllium (METAMUCIL/KONSYL) 58.6 % powder Take by mouth daily      pyridOXINE (VITAMIN B6) 100 MG TABS Take 200 mg by mouth daily       Vitamin D3 (CHOLECALCIFEROL) 25 mcg (1000 units) tablet Take 25 mcg by mouth daily         Allergies:  Patient has no known allergies.    Relevant labs, images, EKGs, Epic and outside hospital (if applicable) charts were reviewed. The findings, diagnosis, plan, and need for follow up were discussed with the patient/family. Nursing notes were reviewed.        Irving Rivera MD  01/01/25 1461

## 2025-01-01 NOTE — DISCHARGE INSTRUCTIONS
You were started on losartan 25 mg daily for blood pressure.  Please follow-up with Dr. Andre as scheduled for repeat blood pressure and labs.

## 2025-01-01 NOTE — ED TRIAGE NOTES
Pt presents to the ED with c/o chest pressure and high blood pressure. PT developed chest pressure while taking down her [place]oda tree. Pt laid down for half hour and chest pressure continued and blood pressure 160/80.    Otilia Key RN on 1/1/2025 at 1:58 PM       Triage Assessment (Adult)       Row Name 01/01/25 0246          Cardiac WDL    Cardiac WDL X;chest pain        Chest Pain Assessment    Chest Pain Location midsternal     Character pressure        Cognitive/Neuro/Behavioral WDL    Cognitive/Neuro/Behavioral WDL WDL

## 2025-01-06 LAB
ATRIAL RATE - MUSE: 74 BPM
DIASTOLIC BLOOD PRESSURE - MUSE: NORMAL MMHG
INTERPRETATION ECG - MUSE: NORMAL
P AXIS - MUSE: 41 DEGREES
PR INTERVAL - MUSE: 148 MS
QRS DURATION - MUSE: 92 MS
QT - MUSE: 392 MS
QTC - MUSE: 435 MS
R AXIS - MUSE: 6 DEGREES
SYSTOLIC BLOOD PRESSURE - MUSE: NORMAL MMHG
T AXIS - MUSE: 15 DEGREES
VENTRICULAR RATE- MUSE: 74 BPM

## 2025-01-16 RX ORDER — LOSARTAN POTASSIUM 25 MG/1
25 TABLET ORAL DAILY
Qty: 90 TABLET | Refills: 0 | OUTPATIENT
Start: 2025-01-16

## 2025-01-16 NOTE — TELEPHONE ENCOUNTER
[id number] of [place] sent Rx request for the following:      Redundant refill request refused: Too soon:  losartan (COZAAR) 25 MG tablet 90 tablet 0 1/1/2025 -- No   Sig - Route: Take 1 tablet (25 mg) by mouth daily. - Oral   Sent to pharmacy as: Losartan Potassium 25 MG Oral Tablet (COZAAR)   Class: E-Prescribe   Order: [id number]   E-Prescribing Status: Receipt confirmed by pharmacy (1/1/2025  4:12 PM CST)     Printout Tracking    External Result Report     Pharmacy    [id number] - [place], [place] - [address] .............. 1/16/2025  11:17 AM      
No

## 2025-01-21 ENCOUNTER — OFFICE VISIT (OUTPATIENT)
Dept: FAMILY MEDICINE | Facility: OTHER | Age: 75
End: 2025-01-21
Attending: FAMILY MEDICINE
Payer: MEDICARE

## 2025-01-21 VITALS
HEART RATE: 71 BPM | RESPIRATION RATE: 16 BRPM | WEIGHT: 144 LBS | SYSTOLIC BLOOD PRESSURE: 134 MMHG | DIASTOLIC BLOOD PRESSURE: 78 MMHG | TEMPERATURE: 97 F | OXYGEN SATURATION: 100 % | BODY MASS INDEX: 24.91 KG/M2

## 2025-01-21 DIAGNOSIS — E78.49 OTHER HYPERLIPIDEMIA: ICD-10-CM

## 2025-01-21 DIAGNOSIS — I10 BENIGN ESSENTIAL HYPERTENSION: Primary | ICD-10-CM

## 2025-01-21 PROCEDURE — G0463 HOSPITAL OUTPT CLINIC VISIT: HCPCS

## 2025-01-21 RX ORDER — LOSARTAN POTASSIUM 50 MG/1
50 TABLET ORAL DAILY
Qty: 90 TABLET | Refills: 3 | Status: SHIPPED | OUTPATIENT
Start: 2025-01-21

## 2025-01-21 RX ORDER — LOSARTAN POTASSIUM 25 MG/1
50 TABLET ORAL DAILY
Qty: 90 TABLET | Refills: 3 | Status: SHIPPED | OUTPATIENT
Start: 2025-01-21 | End: 2025-01-21

## 2025-01-21 RX ORDER — ATORVASTATIN CALCIUM 20 MG/1
20 TABLET, FILM COATED ORAL DAILY
Qty: 90 TABLET | Refills: 4 | Status: SHIPPED | OUTPATIENT
Start: 2025-01-21

## 2025-01-21 ASSESSMENT — PAIN SCALES - GENERAL: PAINLEVEL_OUTOF10: NO PAIN (0)

## 2025-01-21 NOTE — PROGRESS NOTES
Assessment & Plan       ICD-10-CM    1. Benign essential hypertension  I10 Basic Metabolic Panel     losartan (COZAAR) 50 MG tablet     DISCONTINUED: losartan (COZAAR) 25 MG tablet      2. Other hyperlipidemia  E78.49 atorvastatin (LIPITOR) 20 MG tablet        Recommend increasing Cozaar to 50 mg.  She will continue to monitor, if not sufficient, can add hydrochlorothiazide.  Follow-up in 3 weeks for labs, patient will schedule this appointment.      No follow-ups on file.    Delvin Sharma is a 75 year old, presenting for the following health issues:  Hypertension (Follow up on blood pressure )        1/21/2025     9:20 AM   Additional Questions   Roomed by Lalitha   Accompanied by self     History of Present Illness       Hypertension: She presents for follow up of hypertension.  She does check blood pressure  regularly outside of the clinic. Outpatient blood pressures have not been over 140/90. She follows a low salt diet.         Patient had an elevated blood pressure when she was here in May.  She has been monitoring since then. Systolic blood pressure is typically been in the 1 30-1 40 range.    She had  reading that was elevated, systolic blood pressure close to 160.  She also has had chest pain at that time, which she now thinks was probably anxiety.  She presented to the ED and had an unremarkable workup.  She was started on Cozaar 25 mg.   She is tolerating it fine, but blood pressure readings have been about the same as they were prior to the ED visit.        Objective    /78 (BP Location: Right arm, Patient Position: Sitting, Cuff Size: Adult Regular)   Pulse 71   Temp 97  F (36.1  C) (Tympanic)   Resp 16   Wt 65.3 kg (144 lb)   LMP  (LMP Unknown)   SpO2 100%   BMI 24.91 kg/m    Body mass index is 24.91 kg/m .  Physical Exam  Constitutional:       Appearance: She is well-developed.   HENT:      Right Ear: External ear normal.      Left Ear: External ear normal.   Eyes:      General:  No scleral icterus.     Conjunctiva/sclera: Conjunctivae normal.   Cardiovascular:      Rate and Rhythm: Normal rate.   Pulmonary:      Effort: Pulmonary effort is normal. No respiratory distress.   Skin:     Findings: No rash.   Neurological:      Mental Status: She is alert.                 Signed Electronically by: Cyndie Andre MD

## 2025-01-21 NOTE — NURSING NOTE
"Chief Complaint   Patient presents with    Hypertension     Follow up on blood pressure        Initial /78 (BP Location: Right arm, Patient Position: Sitting, Cuff Size: Adult Regular)   Pulse 71   Temp 97  F (36.1  C) (Tympanic)   Resp 16   Wt 65.3 kg (144 lb)   LMP  (LMP Unknown)   SpO2 100%   BMI 24.91 kg/m   Estimated body mass index is 24.91 kg/m  as calculated from the following:    Height as of 1/1/25: 1.619 m (5' 3.75\").    Weight as of this encounter: 65.3 kg (144 lb).  Medication Reconciliation: complete    Lalitha Moore LPN    Advance Care Directive reviewed    "

## 2025-02-11 ENCOUNTER — LAB (OUTPATIENT)
Dept: LAB | Facility: OTHER | Age: 75
End: 2025-02-11
Attending: FAMILY MEDICINE
Payer: MEDICARE

## 2025-02-11 DIAGNOSIS — I10 BENIGN ESSENTIAL HYPERTENSION: ICD-10-CM

## 2025-02-11 LAB
ANION GAP SERPL CALCULATED.3IONS-SCNC: 10 MMOL/L (ref 7–15)
BUN SERPL-MCNC: 16.5 MG/DL (ref 8–23)
CALCIUM SERPL-MCNC: 9.2 MG/DL (ref 8.8–10.4)
CHLORIDE SERPL-SCNC: 104 MMOL/L (ref 98–107)
CREAT SERPL-MCNC: 0.68 MG/DL (ref 0.51–0.95)
EGFRCR SERPLBLD CKD-EPI 2021: 89 ML/MIN/1.73M2
GLUCOSE SERPL-MCNC: 115 MG/DL (ref 70–99)
HCO3 SERPL-SCNC: 28 MMOL/L (ref 22–29)
POTASSIUM SERPL-SCNC: 4.4 MMOL/L (ref 3.4–5.3)
SODIUM SERPL-SCNC: 142 MMOL/L (ref 135–145)

## 2025-02-11 PROCEDURE — 82565 ASSAY OF CREATININE: CPT | Mod: ZL

## 2025-02-11 PROCEDURE — 36415 COLL VENOUS BLD VENIPUNCTURE: CPT | Mod: ZL

## 2025-07-02 ENCOUNTER — OFFICE VISIT (OUTPATIENT)
Dept: FAMILY MEDICINE | Facility: OTHER | Age: 75
End: 2025-07-02
Attending: FAMILY MEDICINE
Payer: MEDICARE

## 2025-07-02 ENCOUNTER — HOSPITAL ENCOUNTER (OUTPATIENT)
Dept: MAMMOGRAPHY | Facility: OTHER | Age: 75
Discharge: HOME | End: 2025-07-02
Attending: FAMILY MEDICINE
Payer: MEDICARE

## 2025-07-02 VITALS
TEMPERATURE: 97.1 F | SYSTOLIC BLOOD PRESSURE: 130 MMHG | HEART RATE: 71 BPM | DIASTOLIC BLOOD PRESSURE: 64 MMHG | BODY MASS INDEX: 23.56 KG/M2 | RESPIRATION RATE: 16 BRPM | WEIGHT: 141.4 LBS | OXYGEN SATURATION: 99 % | HEIGHT: 65 IN

## 2025-07-02 DIAGNOSIS — Z12.31 VISIT FOR SCREENING MAMMOGRAM: ICD-10-CM

## 2025-07-02 DIAGNOSIS — Z00.00 ENCOUNTER FOR MEDICARE ANNUAL WELLNESS EXAM: Primary | ICD-10-CM

## 2025-07-02 DIAGNOSIS — I10 BENIGN ESSENTIAL HYPERTENSION: ICD-10-CM

## 2025-07-02 DIAGNOSIS — E78.49 OTHER HYPERLIPIDEMIA: ICD-10-CM

## 2025-07-02 LAB
ANION GAP SERPL CALCULATED.3IONS-SCNC: 9 MMOL/L (ref 7–15)
BUN SERPL-MCNC: 10.6 MG/DL (ref 8–23)
CALCIUM SERPL-MCNC: 9.2 MG/DL (ref 8.8–10.4)
CHLORIDE SERPL-SCNC: 102 MMOL/L (ref 98–107)
CHOLEST SERPL-MCNC: 174 MG/DL
CREAT SERPL-MCNC: 0.75 MG/DL (ref 0.51–0.95)
EGFRCR SERPLBLD CKD-EPI 2021: 82 ML/MIN/1.73M2
FASTING STATUS PATIENT QL REPORTED: NO
FASTING STATUS PATIENT QL REPORTED: NO
GLUCOSE SERPL-MCNC: 102 MG/DL (ref 70–99)
HCO3 SERPL-SCNC: 27 MMOL/L (ref 22–29)
HDLC SERPL-MCNC: 69 MG/DL
LDLC SERPL CALC-MCNC: 92 MG/DL
NONHDLC SERPL-MCNC: 105 MG/DL
POTASSIUM SERPL-SCNC: 4.5 MMOL/L (ref 3.4–5.3)
SODIUM SERPL-SCNC: 138 MMOL/L (ref 135–145)
TRIGL SERPL-MCNC: 65 MG/DL

## 2025-07-02 PROCEDURE — 80048 BASIC METABOLIC PNL TOTAL CA: CPT | Mod: ZL | Performed by: FAMILY MEDICINE

## 2025-07-02 PROCEDURE — 77063 BREAST TOMOSYNTHESIS BI: CPT

## 2025-07-02 PROCEDURE — 36415 COLL VENOUS BLD VENIPUNCTURE: CPT | Mod: ZL | Performed by: FAMILY MEDICINE

## 2025-07-02 PROCEDURE — 82465 ASSAY BLD/SERUM CHOLESTEROL: CPT | Mod: ZL | Performed by: FAMILY MEDICINE

## 2025-07-02 RX ORDER — ATORVASTATIN CALCIUM 20 MG/1
20 TABLET, FILM COATED ORAL DAILY
Qty: 90 TABLET | Refills: 4 | Status: SHIPPED | OUTPATIENT
Start: 2025-07-02

## 2025-07-02 RX ORDER — LOSARTAN POTASSIUM 50 MG/1
50 TABLET ORAL DAILY
Qty: 90 TABLET | Refills: 3 | Status: SHIPPED | OUTPATIENT
Start: 2025-07-02

## 2025-07-02 SDOH — HEALTH STABILITY: PHYSICAL HEALTH: ON AVERAGE, HOW MANY DAYS PER WEEK DO YOU ENGAGE IN MODERATE TO STRENUOUS EXERCISE (LIKE A BRISK WALK)?: 7 DAYS

## 2025-07-02 SDOH — HEALTH STABILITY: PHYSICAL HEALTH: ON AVERAGE, HOW MANY MINUTES DO YOU ENGAGE IN EXERCISE AT THIS LEVEL?: 20 MIN

## 2025-07-02 ASSESSMENT — SOCIAL DETERMINANTS OF HEALTH (SDOH): HOW OFTEN DO YOU GET TOGETHER WITH FRIENDS OR RELATIVES?: ONCE A WEEK

## 2025-07-02 ASSESSMENT — PAIN SCALES - GENERAL: PAINLEVEL_OUTOF10: NO PAIN (0)

## 2025-07-02 NOTE — NURSING NOTE
"Chief Complaint   Patient presents with    Physical     Yearly wellness physical.        Initial /64 (BP Location: Right arm, Patient Position: Sitting, Cuff Size: Adult Regular)   Pulse 71   Temp 97.1  F (36.2  C) (Temporal)   Resp 16   Ht 1.638 m (5' 4.5\")   Wt 64.1 kg (141 lb 6.4 oz)   LMP  (LMP Unknown)   SpO2 99%   BMI 23.90 kg/m   Estimated body mass index is 23.9 kg/m  as calculated from the following:    Height as of this encounter: 1.638 m (5' 4.5\").    Weight as of this encounter: 64.1 kg (141 lb 6.4 oz).  Medication Reconciliation: complete    Lalitha Moore LPN    Advance Care Directive reviewed    "

## 2025-07-02 NOTE — PROGRESS NOTES
Preventive Care Visit  [facility]  Cyndie Andre MD, Family Medicine  Jul 2, 2025      Assessment & Plan       ICD-10-CM    1. Encounter for Medicare annual wellness exam  Z00.00       2. Benign essential hypertension  I10 losartan (COZAAR) 50 MG tablet     Basic Metabolic Panel     Basic Metabolic Panel      3. Other hyperlipidemia  E78.49 atorvastatin (LIPITOR) 20 MG tablet     Lipid Panel     Lipid Panel        Medications reviewed and refilled.  Labs today.  Mammogram done earlier today.  Reviewed previous DEXA scan and no plan for treatment at this time.  Consider RSV vaccine in the fall.          Counseling  Appropriate preventive services were addressed with this patient via screening, questionnaire, or discussion as appropriate for fall prevention, nutrition, physical activity, Tobacco-use cessation, social engagement, weight loss and cognition.  Checklist reviewing preventive services available has been given to the patient.  Reviewed patient's diet, addressing concerns and/or questions.   The patient was instructed to see the dentist every 6 months.   Patient reported safety concerns were addressed today.        Delvin Sharma is a 75 year old, presenting for the following:  Physical (Yearly wellness physical. )        7/2/2025     3:41 PM   Additional Questions   Roomed by Lalitha   Accompanied by Self          HPI    Patient presents for annual wellness visit.  She has aged out of colon cancer screening.  No further Pap smears indicated.  She had a mammogram done earlier today.  She had a bone density scan done in 2024 which indicated stability from previous bone density scan.  FRAX scores elevated due to family history of hip fracture, but based on overall review we decided not to pursue treatment.       Advance Care Planning    Document on file is a Health Care Directive or POLST.        7/2/2025   General Health   How would you rate your overall physical health? Good   Feel  stress (tense, anxious, or unable to sleep) Only a little   (!) STRESS CONCERN      7/2/2025   Nutrition   Diet: Regular (no restrictions)         7/2/2025   Exercise   Days per week of moderate/strenous exercise 7 days   Average minutes spent exercising at this level 20 min         7/2/2025   Social Factors   Frequency of gathering with friends or relatives Once a week   Worry food won't last until get money to buy more No   Food not last or not have enough money for food? No   Do you have housing? (Housing is defined as stable permanent housing and does not include staying outside in a car, in a tent, in an abandoned building, in an overnight shelter, or couch-surfing.) Yes   Are you worried about losing your housing? No   Lack of transportation? No   Unable to get utilities (heat,electricity)? No         7/2/2025   Fall Risk   Fallen 2 or more times in the past year? No   Trouble with walking or balance? No          7/2/2025   Activities of Daily Living- Home Safety   Needs help with the following daily activites None of the above   Safety concerns in the home Throw rugs in the hallway    No grab bars in the bathroom       Multiple values from one day are sorted in reverse-chronological order         7/2/2025   Dental   Dentist two times every year? (!) NO         7/2/2025   Hearing Screening   Hearing concerns? None of the above         7/2/2025   Driving Risk Screening   Patient/family members have concerns about driving No         7/2/2025   General Alertness/Fatigue Screening   Have you been more tired than usual lately? No         7/2/2025   Urinary Incontinence Screening   Bothered by leaking urine in past 6 months No         Today's PHQ-2 Score:       7/2/2025     3:17 PM   PHQ-2 ( 1999 Pfizer)   Q1: Little interest or pleasure in doing things 0   Q2: Feeling down, depressed or hopeless 0   PHQ-2 Score 0    Q1: Little interest or pleasure in doing things Not at all   Q2: Feeling down, depressed or hopeless  Not at all   PHQ-2 Score 0       Patient-reported           2025   Substance Use   Alcohol more than 3/day or more than 7/wk No   Do you have a current opioid prescription? No   How severe/bad is pain from 1 to 10? 0/10 (No Pain)   Do you use any other substances recreationally? (!) CANNABIS PRODUCTS     Social History     Tobacco Use    Smoking status: Former     Current packs/day: 0.00     Types: Cigarettes     Quit date: 2000     Years since quittin.6    Smokeless tobacco: Never   Vaping Use    Vaping status: Never Used   Substance Use Topics    Alcohol use: Yes     Alcohol/week: 5.8 standard drinks of alcohol     Comment: Alcoholic Drinks/day: slightly    Drug use: Yes     Types: Marijuana           2025   LAST FHS-7 RESULTS   1st degree relative breast or ovarian cancer Yes   Any relative bilateral breast cancer No   Any male have breast cancer No   Any ONE woman have BOTH breast AND ovarian cancer No   Any woman with breast cancer before 50yrs No   2 or more relatives with breast AND/OR ovarian cancer No   2 or more relatives with breast AND/OR bowel cancer No            ASCVD Risk   The 10-year ASCVD risk score (Pako QUEVEDO, et al., 2019) is: 26.8%    Values used to calculate the score:      Age: 75 years      Sex: Female      Is Non- : No      Diabetic: No      Tobacco smoker: No      Systolic Blood Pressure: 130 mmHg      Is BP treated: Yes      HDL Cholesterol: 69 mg/dL      Total Cholesterol: 174 mg/dL            Reviewed and updated as needed this visit by Provider                    Current providers sharing in care for this patient include:  Patient Care Team:  Cyndie Andre MD as PCP - General (Family Practice)  Cyndie Andre MD as Assigned PCP    The following health maintenance items are reviewed in Epic and correct as of today:  Health Maintenance   Topic Date Due    RSV VACCINE (1 - 1-dose 75+ series) Never done    COLORECTAL CANCER SCREENING   "04/20/2024    COVID-19 VACCINE (7 - 2024-25 season) 03/09/2025    INFLUENZA VACCINE (1) 09/01/2025    MEDICARE ANNUAL WELLNESS VISIT  07/02/2026    BMP  07/02/2026    LIPID  07/02/2026    FALL RISK ASSESSMENT  07/02/2026    DIABETES SCREENING  07/02/2028    ADVANCE CARE PLANNING  07/02/2030    DTAP/TDAP/TD VACCINE (3 - Td or Tdap) 05/03/2033    DEXA  06/18/2039    HEPATITIS C SCREENING  Completed    PHQ-2 (once per calendar year)  Completed    PNEUMOCOCCAL VACCINE 50+ YEARS  Completed    ZOSTER VACCINE  Completed    HPV VACCINE  Aged Out    MENINGITIS VACCINE  Aged Out    MAMMO SCREENING  Discontinued          Objective    Exam  /64 (BP Location: Right arm, Patient Position: Sitting, Cuff Size: Adult Regular)   Pulse 71   Temp 97.1  F (36.2  C) (Temporal)   Resp 16   Ht 1.638 m (5' 4.5\")   Wt 64.1 kg (141 lb 6.4 oz)   LMP  (LMP Unknown)   SpO2 99%   BMI 23.90 kg/m     Estimated body mass index is 23.9 kg/m  as calculated from the following:    Height as of this encounter: 1.638 m (5' 4.5\").    Weight as of this encounter: 64.1 kg (141 lb 6.4 oz).    Physical Exam          7/2/2025   Mini Cog   Clock Draw Score 2 Normal   3 Item Recall 2 objects recalled   Mini Cog Total Score 4              Signed Electronically by: Cyndie nAdre MD    "

## 2025-07-02 NOTE — PATIENT INSTRUCTIONS
Patient Education   Preventive Care Advice   This is general advice given by our system to help you stay healthy. However, your care team may have specific advice just for you. Please talk to your care team about your preventive care needs.  Nutrition  Eat 5 or more servings of fruits and vegetables each day.  Try wheat bread, brown rice and whole grain pasta (instead of white bread, rice, and pasta).  Get enough calcium and vitamin D. Check the label on foods and aim for 100% of the RDA (recommended daily allowance).  Lifestyle  Exercise at least 150 minutes each week  (30 minutes a day, 5 days a week).  Do muscle strengthening activities 2 days a week. These help control your weight and prevent disease.  No smoking.  Wear sunscreen to prevent skin cancer.  Have a dental exam and cleaning every 6 months.  Yearly exams  See your health care team every year to talk about:  Any changes in your health.  Any medicines your care team has prescribed.  Preventive care, family planning, and ways to prevent chronic diseases.  Shots (vaccines)   HPV shots (up to age 26), if you've never had them before.  Hepatitis B shots (up to age 59), if you've never had them before.  COVID-19 shot: Get this shot when it's due.  Flu shot: Get a flu shot every year.  Tetanus shot: Get a tetanus shot every 10 years.  Pneumococcal, hepatitis A, and RSV shots: Ask your care team if you need these based on your risk.  Shingles shot (for age 50 and up)  General health tests  Diabetes screening:  Starting at age 35, Get screened for diabetes at least every 3 years.  If you are younger than age 35, ask your care team if you should be screened for diabetes.  Cholesterol test: At age 39, start having a cholesterol test every 5 years, or more often if advised.  Bone density scan (DEXA): At age 50, ask your care team if you should have this scan for osteoporosis (brittle bones).  Hepatitis C: Get tested at least once in your life.  STIs (sexually  transmitted infections)  Before age 24: Ask your care team if you should be screened for STIs.  After age 24: Get screened for STIs if you're at risk. You are at risk for STIs (including HIV) if:  You are sexually active with more than one person.  You don't use condoms every time.  You or a partner was diagnosed with a sexually transmitted infection.  If you are at risk for HIV, ask about PrEP medicine to prevent HIV.  Get tested for HIV at least once in your life, whether you are at risk for HIV or not.  Cancer screening tests  Cervical cancer screening: If you have a cervix, begin getting regular cervical cancer screening tests starting at age 21.  Breast cancer scan (mammogram): If you've ever had breasts, begin having regular mammograms starting at age 40. This is a scan to check for breast cancer.  Colon cancer screening: It is important to start screening for colon cancer at age 45.  Have a colonoscopy test every 10 years (or more often if you're at risk) Or, ask your provider about stool tests like a FIT test every year or Cologuard test every 3 years.  To learn more about your testing options, visit:   .  For help making a decision, visit:   https://bit.ly/kc02559.  Prostate cancer screening test: If you have a prostate, ask your care team if a prostate cancer screening test (PSA) at age 55 is right for you.  Lung cancer screening: If you are a current or former smoker ages 50 to 80, ask your care team if ongoing lung cancer screenings are right for you.  For informational purposes only. Not to replace the advice of your health care provider. Copyright   2023 [facility]. All rights reserved. Clinically reviewed by the [facility]. [employer] [id number] - REV 01/24.  Learning About Activities of Daily Living  What are activities of daily living?     Activities of daily living (ADLs) are the basic self-care tasks you do every day. These include eating, bathing, dressing,  and moving around.  As you age, and if you have health problems, you may find that it's harder to do some of these tasks. If so, your doctor can suggest ideas that may help.  To measure what kind of help you may need, your doctor will ask how well you are able to do ADLs. Let your doctor know if there are any tasks that you are having trouble doing. This is an important first step to getting help. And when you have the help you need, you can stay as independent as possible.  How will a doctor assess your ADLs?  Asking about ADLs is part of a routine health checkup your doctor will likely do as you age. Your health check might be done in a doctor's office, in your home, or at a hospital. The goal is to find out if you are having any problems that could make it hard to care for yourself or that make it unsafe for you to be on your own.  To measure your ADLs, your doctor will ask how hard it is for you to do routine tasks. Your doctor may also want to know if you have changed the way you do a task because of a health problem. Your doctor may watch how you:  Walk back and forth.  Keep your balance while you stand or walk.  Move from sitting to standing or from a bed to a chair.  Button or unbutton a shirt or sweater.  Remove and put on your shoes.  It's common to feel a little worried or anxious if you find you can't do all the things you used to be able to do. Talking with your doctor about ADLs is a way to make sure you're as safe as possible and able to care for yourself as well as you can. You may want to bring a caregiver, friend, or family member to your checkup. They can help you talk to your doctor.  Follow-up care is a key part of your treatment and safety. Be sure to make and go to all appointments, and call your doctor if you are having problems. It's also a good idea to know your test results and keep a list of the medicines you take.  Current as of: October 24, 2024  Content Version: 14.5    8668-0158  [employer].   Care instructions adapted under license by your healthcare professional. If you have questions about a medical condition or this instruction, always ask your healthcare professional. [employer] disclaims any warranty or liability for your use of this information.    Substance Use Disorder: Care Instructions  Overview     You can improve your life and health by stopping your use of alcohol or drugs. When you don't drink or use drugs, you may feel and sleep better. You may get along better with your family, friends, and coworkers. There are medicines and programs that can help with substance use disorder.  How can you care for yourself at home?  Here are some ways to help you stay sober and prevent relapse.  If you have been given medicine to help keep you sober or reduce your cravings, be sure to take it exactly as prescribed.  Talk to your doctor about programs that can help you stop using drugs or drinking alcohol.  Do not keep alcohol or drugs in your home.  Plan ahead. Think about what you'll say if other people ask you to drink or use drugs. Try not to spend time with people who drink or use drugs.  Use the time and money spent on drinking or drugs to do something that's important to you.  Preventing a relapse  Have a plan to deal with relapse. Learn to recognize changes in your thinking that lead you to drink or use drugs. Get help before you start to drink or use drugs again.  Try to stay away from situations, friends, or places that may lead you to drink or use drugs.  If you feel the need to drink alcohol or use drugs again, seek help right away. Call a trusted friend or family member. Some people get support from organizations such as Narcotics Anonymous or [employer] or from treatment facilities.  If you relapse, get help as soon as you can. Some people make a plan with another person that outlines what they want that person to do for them if they relapse. The plan  usually includes how to handle the relapse and who to notify in case of relapse.  Don't give up. Remember that a relapse doesn't mean that you have failed. Use the experience to learn the triggers that lead you to drink or use drugs. Then quit again. Recovery is a lifelong process. Many people have several relapses before they are able to quit for good.  Follow-up care is a key part of your treatment and safety. Be sure to make and go to all appointments, and call your doctor if you are having problems. It's also a good idea to know your test results and keep a list of the medicines you take.  When should you call for help?   Call 911  anytime you think you may need emergency care. For example, call if you or someone else:    Has overdosed or has withdrawal signs. Be sure to tell the emergency workers that you are or someone else is using or trying to quit using drugs. Overdose or withdrawal signs may include:  Losing consciousness.  Seizure.  Seeing or hearing things that aren't there (hallucinations).     Is thinking or talking about suicide or harming others.   Where to get help 24 hours a day, 7 days a week   If you or someone you know talks about suicide, self-harm, a mental health crisis, a substance use crisis, or any other kind of emotional distress, get help right away. You can:    Call the Suicide and Crisis Lifeline at 128.     Call 3-281-040-LFJW (1-541.253.3693).     Text HOME to 562081 to access the Crisis Text Line.   Consider saving these numbers in your phone.  Go to [employer] for more information or to chat online.  Call your doctor now or seek immediate medical care if:    You are having withdrawal symptoms. These may include nausea or vomiting, sweating, shakiness, and anxiety.   Watch closely for changes in your health, and be sure to contact your doctor if:    You have a relapse.     You need more help or support to stop.   Where can you learn more?  Go to  "https://www.[employer].net/patiented  Enter H573 in the search box to learn more about \"Substance Use Disorder: Care Instructions.\"  Current as of: August 20, 2024  Content Version: 14.5 2024-2025 [employer].   Care instructions adapted under license by your healthcare professional. If you have questions about a medical condition or this instruction, always ask your healthcare professional. [employer] disclaims any warranty or liability for your use of this information.       "

## (undated) RX ORDER — LOSARTAN POTASSIUM 25 MG/1
TABLET ORAL
Status: DISPENSED
Start: 2025-01-01

## (undated) RX ORDER — DIPHENHYDRAMINE HYDROCHLORIDE 50 MG/ML
INJECTION INTRAMUSCULAR; INTRAVENOUS
Status: DISPENSED
Start: 2019-01-14

## (undated) RX ORDER — METHYLPREDNISOLONE SODIUM SUCCINATE 125 MG/2ML
INJECTION, POWDER, LYOPHILIZED, FOR SOLUTION INTRAMUSCULAR; INTRAVENOUS
Status: DISPENSED
Start: 2019-01-14